# Patient Record
Sex: MALE | Race: WHITE | NOT HISPANIC OR LATINO | Employment: OTHER | URBAN - METROPOLITAN AREA
[De-identification: names, ages, dates, MRNs, and addresses within clinical notes are randomized per-mention and may not be internally consistent; named-entity substitution may affect disease eponyms.]

---

## 2018-08-30 ENCOUNTER — HOSPITAL ENCOUNTER (OUTPATIENT)
Dept: RADIOLOGY | Facility: HOSPITAL | Age: 78
Discharge: HOME | End: 2018-08-30
Attending: ORTHOPAEDIC SURGERY
Payer: MEDICARE

## 2018-08-30 DIAGNOSIS — M25.511 PAIN IN RIGHT SHOULDER: ICD-10-CM

## 2018-08-30 PROCEDURE — 73030 X-RAY EXAM OF SHOULDER: CPT | Mod: TC,RT

## 2020-08-17 ENCOUNTER — OFFICE VISIT (OUTPATIENT)
Dept: CARDIOLOGY | Facility: CLINIC | Age: 80
End: 2020-08-17
Payer: MEDICARE

## 2020-08-17 VITALS
SYSTOLIC BLOOD PRESSURE: 153 MMHG | HEIGHT: 68 IN | HEART RATE: 66 BPM | DIASTOLIC BLOOD PRESSURE: 82 MMHG | BODY MASS INDEX: 29.27 KG/M2 | WEIGHT: 193.13 LBS | OXYGEN SATURATION: 95 %

## 2020-08-17 DIAGNOSIS — E78.5 HYPERLIPIDEMIA, UNSPECIFIED HYPERLIPIDEMIA TYPE: ICD-10-CM

## 2020-08-17 DIAGNOSIS — I25.119 CORONARY ARTERY DISEASE INVOLVING NATIVE HEART WITH ANGINA PECTORIS, UNSPECIFIED VESSEL OR LESION TYPE: ICD-10-CM

## 2020-08-17 DIAGNOSIS — Z95.1 S/P CABG (CORONARY ARTERY BYPASS GRAFT): ICD-10-CM

## 2020-08-17 DIAGNOSIS — M48.061 SPINAL STENOSIS OF LUMBAR REGION, UNSPECIFIED WHETHER NEUROGENIC CLAUDICATION PRESENT: ICD-10-CM

## 2020-08-17 DIAGNOSIS — F41.9 ANXIETY: ICD-10-CM

## 2020-08-17 PROBLEM — I25.10 CORONARY ARTERY DISEASE INVOLVING NATIVE HEART: Status: ACTIVE | Noted: 2020-08-17

## 2020-08-17 PROCEDURE — 99214 OFFICE O/P EST MOD 30 MIN: CPT | Mod: PBBFAC | Performed by: INTERNAL MEDICINE

## 2020-08-17 PROCEDURE — 99999 PR PBB SHADOW E&M-EST. PATIENT-LVL IV: ICD-10-PCS | Mod: PBBFAC,,, | Performed by: INTERNAL MEDICINE

## 2020-08-17 PROCEDURE — 99204 PR OFFICE/OUTPT VISIT, NEW, LEVL IV, 45-59 MIN: ICD-10-PCS | Mod: S$PBB,,, | Performed by: INTERNAL MEDICINE

## 2020-08-17 PROCEDURE — 99204 OFFICE O/P NEW MOD 45 MIN: CPT | Mod: S$PBB,,, | Performed by: INTERNAL MEDICINE

## 2020-08-17 PROCEDURE — 99999 PR PBB SHADOW E&M-EST. PATIENT-LVL IV: CPT | Mod: PBBFAC,,, | Performed by: INTERNAL MEDICINE

## 2020-08-17 RX ORDER — GLUCOSAMINE/CHONDRO SU A 500-400 MG
1 TABLET ORAL DAILY
COMMUNITY

## 2020-08-17 RX ORDER — ASPIRIN 81 MG/1
81 TABLET ORAL DAILY
COMMUNITY

## 2020-08-17 RX ORDER — ASCORBIC ACID 500 MG
1000 TABLET ORAL DAILY
COMMUNITY

## 2020-08-17 RX ORDER — ACETAMINOPHEN 500 MG
5000 TABLET ORAL DAILY
COMMUNITY

## 2020-08-17 RX ORDER — AMOXICILLIN 500 MG
1 CAPSULE ORAL DAILY
COMMUNITY

## 2020-08-17 RX ORDER — METOPROLOL SUCCINATE 25 MG/1
25 TABLET, EXTENDED RELEASE ORAL 2 TIMES DAILY
Qty: 30 TABLET | Refills: 11 | Status: SHIPPED | OUTPATIENT
Start: 2020-08-17

## 2020-08-17 RX ORDER — NITROGLYCERIN 0.4 MG/1
0.4 TABLET SUBLINGUAL EVERY 5 MIN PRN
Qty: 25 TABLET | Refills: 0 | Status: SHIPPED | OUTPATIENT
Start: 2020-08-17 | End: 2021-08-17

## 2020-08-17 RX ORDER — ISOSORBIDE MONONITRATE 30 MG/1
30 TABLET, EXTENDED RELEASE ORAL 2 TIMES DAILY
Qty: 30 TABLET | Refills: 11 | Status: SHIPPED | OUTPATIENT
Start: 2020-08-17

## 2020-08-17 NOTE — ASSESSMENT & PLAN NOTE
[facility] from 7/23/20 with Dr Baron reviewed which showed diffuse RCA disease with occluded SVG-Diag and SVG-RCA. LIMA graft is no longer filling the LAD. He is on low dose Imdur and Toprol XL which we will increase today.

## 2020-08-17 NOTE — LETTER
August 17, 2020      Richy Geiger MD  [address]  [address]  [facility] [address]           [facility] 3rd Fl  [address]  [facility] [address]  Phone: [phone]          Patient: Giacomo Scales   MR Number: [id number]   YOB: 1940   Date of Visit: 8/17/2020       Dear Dr. Richy Geiger:    Thank you for referring Giacomo Scales to me for evaluation. Attached you will find relevant portions of my assessment and plan of care.    If you have questions, please do not hesitate to call me. I look forward to following Giacomo Scales along with you.    Sincerely,    Lei Astorga MD    Enclosure  CC:  No Recipients    If you would like to receive this communication electronically, please contact [email or web address] or [phone] to request more information on [employer] Link access.    For providers and/or their staff who would like to refer a patient to [facility], please contact us through our one-stop-shop provider referral line, [facility] [place], at [phone].    If you feel you have received this communication in error or would no longer like to receive these types of communications, please e-mail [email or web address]

## 2020-08-17 NOTE — PROGRESS NOTES
Subjective:    Patient ID:  Giacomo Scales is a 80 y.o. male who presents for evaluation of Coronary artery disease    Referring Physician: Dr. Geiger    HPI  Mr. Scales is a 80 year old male with a past medical history of CAD (s/p CABG LIMA-LAD, SVG-Diag, SVG-RCA), spinal stenosis with neuropathy, HLD, tricuspid regurgitation, and anxiety who presents for evaluation of recurrent CP following angiogram. Patient underwent coronary angiogram on 723/20 at [facility] with Dr. Baron that showed diffuse RCA disease with occluded SVG-Diag and possible occluded LIMA-LAD. Today he reports dyspnea on exertion after about 50 feet along with chest pressure that has been present since after his CABG. He underwent CABG on 02/27/2020 after an angiogram showed severe multivessel disease. He reported SOB as his symptom prior to CABG.  He went to cardiac rehab for about 2 months after surgery without symptoms. He smoked when he was a teenager but has not smoked in about 40 years. He denies LE swelling, orthopnea, PND, palpitations, and weight gain.     NYHA: II CCS: 0    Review of Systems   Constitution: Positive for malaise/fatigue. Negative for chills and fever.   HENT: Negative for sore throat.    Eyes: Negative for blurred vision.   Cardiovascular: Positive for dyspnea on exertion. Negative for chest pain, claudication, cyanosis, irregular heartbeat, leg swelling, near-syncope, orthopnea, palpitations, paroxysmal nocturnal dyspnea and syncope.   Respiratory: Negative for cough and sputum production.    Hematologic/Lymphatic: Does not bruise/bleed easily.   Skin: Negative for itching, rash and suspicious lesions.   Musculoskeletal: Negative for falls.   Gastrointestinal: Negative for abdominal pain and change in bowel habit.   Genitourinary: Negative for dysuria.   Neurological: Negative for disturbances in coordination, dizziness and loss of balance.   Psychiatric/Behavioral: Negative for altered mental status.       "    Past Medical History:   Diagnosis Date    Coronary artery disease      Current Outpatient Medications on File Prior to Visit   Medication Sig Dispense Refill    ascorbic acid, vitamin C, (VITAMIN C) 500 MG tablet Take 1,000 mg by mouth once daily.      aspirin (ECOTRIN) 81 MG EC tablet Take 81 mg by mouth once daily.      cholecalciferol, vitamin D3, (VITAMIN D3) 125 mcg (5,000 unit) Tab Take 5,000 Units by mouth once daily.      folic acid/multivit-min/lutein (CENTRUM SILVER ORAL) Take 1 tablet by mouth once daily.      glucosamine-chondroitin 500-400 mg tablet Take 1 tablet by mouth once daily.      omega-3 fatty acids/fish oil (FISH OIL-OMEGA-3 FATTY ACIDS) 300-1,000 mg capsule Take 1 capsule by mouth once daily.       No current facility-administered medications on file prior to visit.        Vitals:    08/17/20 0911 08/17/20 0913   BP: (!) 148/79 (!) 153/82   BP Location: Right arm Left arm   Patient Position: Sitting Sitting   BP Method: Large (Automatic) Large (Automatic)   Pulse: 66 66   SpO2: 95%    Weight: 87.6 kg (193 lb 2 oz)    Height: 5' 8" (1.727 m)      Body mass index is 29.36 kg/m².  Objective:    Physical Exam   Constitutional: He is oriented to person, place, and time. He appears well-developed and well-nourished. No distress.   HENT:   Head: Normocephalic and atraumatic.   Eyes: EOM are normal.   Neck: Normal range of motion. No JVD present.   Cardiovascular: Normal rate, regular rhythm and intact distal pulses.   No murmur heard.  Pulmonary/Chest: Effort normal and breath sounds normal. No respiratory distress.   Abdominal: Soft. He exhibits no distension.   Musculoskeletal:         General: No edema.   Neurological: He is alert and oriented to person, place, and time.   Skin: Skin is warm and dry. He is not diaphoretic.   Vitals reviewed.        Test(s) Reviewed  I have reviewed the following in detail:  [] Stress test   [x] Angiography   [] Echocardiogram   [] Labs:   [] Other: "     Assessment:   Coronary artery disease involving native heart  [facility] from 7/23/20 with Dr Baron reviewed which showed diffuse RCA disease with occluded SVG-Diag and SVG-RCA. LIMA graft is no longer filling the LAD. He is on low dose Imdur and Toprol XL which we will increase today.     S/P CABG (coronary artery bypass graft)  S/p emergent CABG LIMA-LAD, SVG-Diag, SVG-RCA with Dr. Escobedo on 2/27/20 after a failing angioplasty on 2/26/20.     Anxiety  Controlled on Wellbutrin.     Lumbar stenosis  Chronic lower back pain limiting activity.     HLD (hyperlipidemia)  On Crestor 40 mg.     Plan:     1. Patient was offered angiogram +/- intervention and he will consider it. He will call back if he decided to proceed. Consents signed in clinic today.    2. Imdur 30 mg and Toprol XL 25 mg increased to BID.   3. SL NTG PRN ordered.     - The patient understands the risks and benefits and wishes to go ahead with the procedure.  - All patient's questions were answered.  -The risks, benefits & alternatives of the procedure were explained to the patient  -Discussed in detailed the risk of bleeding, infection, heart rhythm abnormalities, allergic reactions, kidney injury, stroke and death.    -The risks of moderate sedation include hypotension, respiratory depression, arrhythmias, bronchospasm, & death.    -Informed consent was obtained & the patient is agreeable to proceed with the procedure.  -This patient was discussed with the attending cardiologist who agrees with the above assessment & plan.       No orders of the defined types were placed in this encounter.    Medications Ordered This Encounter   Medications    isosorbide mononitrate (IMDUR) 30 MG 24 hr tablet    metoprolol succinate (TOPROL-XL) 25 MG 24 hr tablet    nitroGLYCERIN (NITROSTAT) 0.4 MG SL tablet       Kinza Cowan PA-C  Interventional Cardiology  [facility]    Staff:  I have personally taken the history and examined this patient  and agree with the fellow's note as stated above and amended it accordingly :-)  This unfortunate man had failed LAD PCI in Feb with emergent CABG with LIMA to LAD and SVG's x 3.  His symptoms didn't improve so he underwent angiography by Dr. Geiger in July 2020 which showed LIMA to LAD never fills the LAD distally.  Not sure if totalled or subtotalled.  SVG to RCA occluded and RCA diffusely diseased.  SVG to OMB occluded with diffuse LCX disease.  SVG to Diagonal subtotalled, small, and too small for intervention.      IMP:      CAD:  Failed or failing bypass grafts as above.      REC:     Restudy LIMA to LAD with possible PCI if subtotally occluded.  Access RCFA above [place].  Informed consent obtained.  Check PRU.

## 2022-05-25 ENCOUNTER — HOSPITAL ENCOUNTER (EMERGENCY)
Facility: HOSPITAL | Age: 82
Discharge: HOME | End: 2022-05-25
Attending: EMERGENCY MEDICINE
Payer: MEDICARE

## 2022-05-25 ENCOUNTER — HOSPITAL ENCOUNTER (OUTPATIENT)
Facility: HOSPITAL | Age: 82
Discharge: HOME | End: 2022-05-27
Attending: EMERGENCY MEDICINE | Admitting: INTERNAL MEDICINE
Payer: MEDICARE

## 2022-05-25 VITALS
DIASTOLIC BLOOD PRESSURE: 79 MMHG | TEMPERATURE: 98 F | SYSTOLIC BLOOD PRESSURE: 161 MMHG | HEART RATE: 56 BPM | RESPIRATION RATE: 18 BRPM | OXYGEN SATURATION: 95 %

## 2022-05-25 DIAGNOSIS — R04.0 EPISTAXIS: Primary | ICD-10-CM

## 2022-05-25 DIAGNOSIS — R07.9 CHEST PAIN: ICD-10-CM
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
## 2022-05-25 DIAGNOSIS — S09.90XA CLOSED HEAD INJURY, INITIAL ENCOUNTER: ICD-10-CM

## 2022-05-25 DIAGNOSIS — S01.81XA LACERATION OF FOREHEAD, INITIAL ENCOUNTER: ICD-10-CM

## 2022-05-25 LAB
ALBUMIN SERPL BCP-MCNC: 3.5 G/DL (ref 3.5–5.2)
ALP SERPL-CCNC: 45 U/L (ref 55–135)
ALT SERPL W/O P-5'-P-CCNC: 11 U/L (ref 10–44)
ANION GAP SERPL CALC-SCNC: 10 MMOL/L (ref 8–16)
AST SERPL-CCNC: 15 U/L (ref 10–40)
BASOPHILS # BLD AUTO: 0.01 K/UL (ref 0–0.2)
BASOPHILS NFR BLD: 0.1 % (ref 0–1.9)
BILIRUB SERPL-MCNC: 0.9 MG/DL (ref 0.1–1)
BUN SERPL-MCNC: 32 MG/DL (ref 6–30)
BUN SERPL-MCNC: 39 MG/DL (ref 8–23)
CALCIUM SERPL-MCNC: 9.5 MG/DL (ref 8.7–10.5)
CHLORIDE SERPL-SCNC: 105 MMOL/L (ref 95–110)
CHLORIDE SERPL-SCNC: 107 MMOL/L (ref 95–110)
CO2 SERPL-SCNC: 24 MMOL/L (ref 23–29)
CREAT SERPL-MCNC: 1.5 MG/DL (ref 0.5–1.4)
CREAT SERPL-MCNC: 1.6 MG/DL (ref 0.5–1.4)
DIFFERENTIAL METHOD: ABNORMAL
EOSINOPHIL # BLD AUTO: 0.1 K/UL (ref 0–0.5)
EOSINOPHIL NFR BLD: 0.7 % (ref 0–8)
ERYTHROCYTE [DISTWIDTH] IN BLOOD BY AUTOMATED COUNT: 12.8 % (ref 11.5–14.5)
EST. GFR  (AFRICAN AMERICAN): 46 ML/MIN/1.73 M^2
EST. GFR  (NON AFRICAN AMERICAN): 39.8 ML/MIN/1.73 M^2
GLUCOSE SERPL-MCNC: 122 MG/DL (ref 70–110)
GLUCOSE SERPL-MCNC: 133 MG/DL (ref 70–110)
HCT VFR BLD AUTO: 35.8 % (ref 40–54)
HCT VFR BLD CALC: 34 %PCV (ref 36–54)
HGB BLD-MCNC: 12.3 G/DL (ref 14–18)
IMM GRANULOCYTES # BLD AUTO: 0.02 K/UL (ref 0–0.04)
IMM GRANULOCYTES NFR BLD AUTO: 0.3 % (ref 0–0.5)
LYMPHOCYTES # BLD AUTO: 1.2 K/UL (ref 1–4.8)
LYMPHOCYTES NFR BLD: 16.9 % (ref 18–48)
MCH RBC QN AUTO: 35.3 PG (ref 27–31)
MCHC RBC AUTO-ENTMCNC: 34.4 G/DL (ref 32–36)
MCV RBC AUTO: 103 FL (ref 82–98)
MONOCYTES # BLD AUTO: 0.7 K/UL (ref 0.3–1)
MONOCYTES NFR BLD: 9.9 % (ref 4–15)
NEUTROPHILS # BLD AUTO: 5.1 K/UL (ref 1.8–7.7)
NEUTROPHILS NFR BLD: 72.1 % (ref 38–73)
NRBC BLD-RTO: 0 /100 WBC
PLATELET # BLD AUTO: 121 K/UL (ref 150–450)
PMV BLD AUTO: 9.2 FL (ref 9.2–12.9)
POC IONIZED CALCIUM: 1.28 MMOL/L (ref 1.06–1.42)
POC TCO2 (MEASURED): 26 MMOL/L (ref 23–29)
POTASSIUM BLD-SCNC: 4 MMOL/L (ref 3.5–5.1)
POTASSIUM SERPL-SCNC: 4.2 MMOL/L (ref 3.5–5.1)
PROT SERPL-MCNC: 6.5 G/DL (ref 6–8.4)
RBC # BLD AUTO: 3.48 M/UL (ref 4.6–6.2)
SAMPLE: ABNORMAL
SODIUM BLD-SCNC: 142 MMOL/L (ref 136–145)
SODIUM SERPL-SCNC: 141 MMOL/L (ref 136–145)
WBC # BLD AUTO: 7 K/UL (ref 3.9–12.7)

## 2022-05-25 PROCEDURE — 80053 COMPREHEN METABOLIC PANEL: CPT | Performed by: PHYSICIAN ASSISTANT

## 2022-05-25 PROCEDURE — 12001 RPR S/N/AX/GEN/TRNK 2.5CM/<: CPT

## 2022-05-25 PROCEDURE — 80047 BASIC METABLC PNL IONIZED CA: CPT

## 2022-05-25 PROCEDURE — 25000003 PHARM REV CODE 250: Performed by: EMERGENCY MEDICINE

## 2022-05-25 PROCEDURE — 85025 COMPLETE CBC W/AUTO DIFF WBC: CPT | Performed by: PHYSICIAN ASSISTANT

## 2022-05-25 PROCEDURE — 99220 PR INITIAL OBSERVATION CARE,LEVL III: ICD-10-PCS | Mod: ,,, | Performed by: PHYSICIAN ASSISTANT

## 2022-05-25 PROCEDURE — 99285 PR EMERGENCY DEPT VISIT,LEVEL V: ICD-10-PCS | Mod: 25,,, | Performed by: EMERGENCY MEDICINE

## 2022-05-25 PROCEDURE — 99285 EMERGENCY DEPT VISIT HI MDM: CPT | Mod: 25,,, | Performed by: EMERGENCY MEDICINE

## 2022-05-25 PROCEDURE — 25000003 PHARM REV CODE 250: Performed by: PHYSICIAN ASSISTANT

## 2022-05-25 PROCEDURE — 12001 RPR S/N/AX/GEN/TRNK 2.5CM/<: CPT | Mod: 51,,, | Performed by: EMERGENCY MEDICINE

## 2022-05-25 PROCEDURE — G0378 HOSPITAL OBSERVATION PER HR: HCPCS

## 2022-05-25 PROCEDURE — 30903 CONTROL OF NOSEBLEED: CPT | Mod: LT

## 2022-05-25 PROCEDURE — 99499 UNLISTED E&M SERVICE: CPT | Mod: ,,, | Performed by: PHYSICIAN ASSISTANT

## 2022-05-25 PROCEDURE — 82330 ASSAY OF CALCIUM: CPT

## 2022-05-25 PROCEDURE — 99220 PR INITIAL OBSERVATION CARE,LEVL III: CPT | Mod: ,,, | Performed by: PHYSICIAN ASSISTANT

## 2022-05-25 PROCEDURE — 99285 EMERGENCY DEPT VISIT HI MDM: CPT | Mod: 25

## 2022-05-25 PROCEDURE — 12001 PR RESUPERF WND BODY <2.5CM: ICD-10-PCS | Mod: 51,,, | Performed by: EMERGENCY MEDICINE

## 2022-05-25 PROCEDURE — 99284 EMERGENCY DEPT VISIT MOD MDM: CPT | Mod: 25,27

## 2022-05-25 PROCEDURE — 99499 NO LOS: ICD-10-PCS | Mod: ,,, | Performed by: PHYSICIAN ASSISTANT

## 2022-05-25 PROCEDURE — 30903 PR CTRL NOSEBLEED,ANTER,COMPLEX: ICD-10-PCS | Mod: 59,,, | Performed by: EMERGENCY MEDICINE

## 2022-05-25 PROCEDURE — 30903 CONTROL OF NOSEBLEED: CPT | Mod: 59,,, | Performed by: EMERGENCY MEDICINE

## 2022-05-25 RX ORDER — PROMETHAZINE HYDROCHLORIDE 25 MG/1
25 TABLET ORAL EVERY 6 HOURS PRN
Status: DISCONTINUED | OUTPATIENT
Start: 2022-05-26 | End: 2022-05-27 | Stop reason: HOSPADM

## 2022-05-25 RX ORDER — GLUCAGON 1 MG
1 KIT INJECTION
Status: DISCONTINUED | OUTPATIENT
Start: 2022-05-26 | End: 2022-05-27 | Stop reason: HOSPADM

## 2022-05-25 RX ORDER — IBUPROFEN 200 MG
16 TABLET ORAL
Status: DISCONTINUED | OUTPATIENT
Start: 2022-05-26 | End: 2022-05-27 | Stop reason: HOSPADM

## 2022-05-25 RX ORDER — TALC
6 POWDER (GRAM) TOPICAL NIGHTLY PRN
Status: DISCONTINUED | OUTPATIENT
Start: 2022-05-26 | End: 2022-05-27 | Stop reason: HOSPADM

## 2022-05-25 RX ORDER — POLYETHYLENE GLYCOL 3350 17 G/17G
17 POWDER, FOR SOLUTION ORAL DAILY PRN
Status: DISCONTINUED | OUTPATIENT
Start: 2022-05-26 | End: 2022-05-27 | Stop reason: HOSPADM

## 2022-05-25 RX ORDER — BISACODYL 10 MG
10 SUPPOSITORY, RECTAL RECTAL DAILY PRN
Status: DISCONTINUED | OUTPATIENT
Start: 2022-05-26 | End: 2022-05-27 | Stop reason: HOSPADM

## 2022-05-25 RX ORDER — ACETAMINOPHEN 325 MG/1
650 TABLET ORAL EVERY 4 HOURS PRN
Status: DISCONTINUED | OUTPATIENT
Start: 2022-05-26 | End: 2022-05-27 | Stop reason: HOSPADM

## 2022-05-25 RX ORDER — TICAGRELOR 90 MG/1
90 TABLET ORAL 2 TIMES DAILY
COMMUNITY
Start: 2022-02-14

## 2022-05-25 RX ORDER — IPRATROPIUM BROMIDE AND ALBUTEROL SULFATE 2.5; .5 MG/3ML; MG/3ML
3 SOLUTION RESPIRATORY (INHALATION) EVERY 4 HOURS PRN
Status: DISCONTINUED | OUTPATIENT
Start: 2022-05-26 | End: 2022-05-27 | Stop reason: HOSPADM

## 2022-05-25 RX ORDER — AMOXICILLIN AND CLAVULANATE POTASSIUM 875; 125 MG/1; MG/1
1 TABLET, FILM COATED ORAL 2 TIMES DAILY
Qty: 14 TABLET | Refills: 0 | Status: SHIPPED | OUTPATIENT
Start: 2022-05-25 | End: 2022-05-27 | Stop reason: SDUPTHER

## 2022-05-25 RX ORDER — ONDANSETRON 8 MG/1
8 TABLET, ORALLY DISINTEGRATING ORAL EVERY 8 HOURS PRN
Status: DISCONTINUED | OUTPATIENT
Start: 2022-05-26 | End: 2022-05-27 | Stop reason: HOSPADM

## 2022-05-25 RX ORDER — OXYMETAZOLINE HCL 0.05 %
1 SPRAY, NON-AEROSOL (ML) NASAL
Status: COMPLETED | OUTPATIENT
Start: 2022-05-25 | End: 2022-05-25

## 2022-05-25 RX ORDER — IBUPROFEN 200 MG
24 TABLET ORAL
Status: DISCONTINUED | OUTPATIENT
Start: 2022-05-26 | End: 2022-05-27 | Stop reason: HOSPADM

## 2022-05-25 RX ADMIN — LIDOCAINE HYDROCHLORIDE 1 ML: 10; .005 INJECTION, SOLUTION EPIDURAL; INFILTRATION; INTRACAUDAL; PERINEURAL at 10:05

## 2022-05-25 RX ADMIN — Medication: at 08:05

## 2022-05-25 RX ADMIN — Medication 1 SPRAY: at 10:05

## 2022-05-25 RX ADMIN — Medication: at 10:05

## 2022-05-25 RX ADMIN — Medication 1 SPRAY: at 04:05

## 2022-05-25 NOTE — ED NOTES
Forehead laceration bleeding controlled with gauze. Waiting on provider. PT in no acute distress.

## 2022-05-25 NOTE — ED NOTES
Giacomo Scales, a 82 y.o. male presents to the ED w/ complaint of a mechanical fall, pt states he was trying to sit down in a chair and tripped over his feet. Reports most recent fall about a month ago. + blood thinners. Forehead and nose laceration noted.     Triage note:  Chief Complaint   Patient presents with    Fall     Arrives with house sup from [facility] in hospital was here visiting his wife in surgery and had a mechanical fall, pt is on blood thinners, hit his head, laceration noted to nose and forehead     Review of patient's allergies indicates:   Allergen Reactions    Ibuprofen      Causes nose bleed    Tylenol-codeine #3 [acetaminophen-codeine]      Upset stomach and dizzness     Past Medical History:   Diagnosis Date    Coronary artery disease      Two patient identifiers have been checked and are correct.      Appearance: Pt awake, alert & oriented to person, place & time. Pt in no acute distress at present time. Pt is clean and well groomed with clothes appropriately fastened.   Skin: Skin warm, dry & intact. Color consistent with ethnicity. Mucous membranes moist. No breakdown or brusing noted.  Forehead laceration from mechanical fall  Musculoskeletal: Patient moving all extremities well, no obvious swelling or deformities noted.   Respiratory: Respirations spontaneous, even, and non-labored. Visible chest rise noted. Airway is open and patent. No accessory muscle use noted.   Neurologic: Sensation is intact. Speech is clear and appropriate. Eyes open spontaneously, behavior appropriate to situation, follows commands, facial expression symmetrical, bilateral hand grasp equal and even, purposeful motor response noted.  Cardiac: All peripheral pulses present. No Bilateral lower extremity edema. Cap refill is <3 seconds.  Abdomen: Abdomen soft, non-tender to palpation.   : Pt reports no dysuria or hematuria.

## 2022-05-25 NOTE — ED NOTES
Pt AAOx4. RR is even, unlabored, and spontaneous. Pt's wife is having surgery at [facility] -- pt fell in [facility]. Pt hit head; denies LOC. Edema and hematoma present on forehead. Epistaxis present at time of injury. Pt d/c'd earlier, however, pt returned to ED d/t bleeding re-starting from nose. Pt on blood thinners. Denies pain. Family at bedside. Bed low and locked; side rails up x2; call light within reach. Will continue to monitor.    Patient identifiers for Giacomo Scales 82 y.o. male checked and correct.  Chief Complaint   Patient presents with    Fall     TRIPPED AND FELL IN [facility],seen in er earlier,   had ct scan, hematoma to forehead, nose bleeding more     Past Medical History:   Diagnosis Date    Coronary artery disease      Allergies reported:   Review of patient's allergies indicates:   Allergen Reactions    Ibuprofen      Causes nose bleed    Tylenol-codeine #3 [acetaminophen-codeine]      Upset stomach and dizzness

## 2022-05-25 NOTE — ED PROVIDER NOTES
Encounter Date: 5/25/2022       History     Chief Complaint   Patient presents with    Fall     Ar[facility] with [facility] sup [facility] in hospital was here visiting his wife in surgery and had a mechanical fall, pt is on blood thinners, hit his head, laceration noted to nose and forehead     HPI     This is an 82-year-old man who presents status post mechanical fall.  His wife had surgery here today and he was visiting her.  He reports that he tripped over his feet.  He did hit his head, but did not pass out.  He takes Brilinta for CAD.  He otherwise denies health conditions.  He reports that his tetanus has been updated in the past 5 years.  He denies any other extremity pain or symptoms.  Review of patient's allergies indicates:   Allergen Reactions    Ibuprofen      Causes nose bleed    Tylenol-codeine #3 [acetaminophen-codeine]      Upset stomach and dizzness     Past Medical History:   Diagnosis Date    Coronary artery disease      Past Surgical History:   Procedure Laterality Date    CORONARY ANGIOPLASTY      CORONARY ARTERY BYPASS GRAFT       Family History   Problem Relation Age of Onset    Heart disease Mother     Hypertension Mother     Heart disease Father     Hypertension Father     No Known Problems Sister     No Known Problems Brother     No Known Problems Maternal Aunt     No Known Problems Maternal Uncle     No Known Problems Paternal Aunt     No Known Problems Paternal Uncle     No Known Problems Maternal Grandmother     No Known Problems Maternal Grandfather     No Known Problems Paternal Grandmother     No Known Problems Paternal Grandfather     Anemia Neg Hx     Arrhythmia Neg Hx     Asthma Neg Hx     Clotting disorder Neg Hx     Fainting Neg Hx     Heart attack Neg Hx     Heart failure Neg Hx     Hyperlipidemia Neg Hx     Stroke Neg Hx     Atrial Septal Defect Neg Hx      Social History     Tobacco Use    Smoking status: Former Smoker    Smokeless tobacco: Never Used    Substance Use Topics    Alcohol use: Never    Drug use: Never     Review of Systems   Constitutional: Negative for chills, diaphoresis, fatigue and fever.   HENT: Negative for congestion, rhinorrhea and sore throat.    Eyes: Negative for visual disturbance.   Respiratory: Negative for cough, chest tightness and shortness of breath.    Cardiovascular: Negative for chest pain.   Gastrointestinal: Negative for abdominal pain, blood in stool, constipation, diarrhea and vomiting.   Genitourinary: Negative for dysuria, hematuria and urgency.   Musculoskeletal: Negative for back pain.   Skin: Positive for wound. Negative for rash.   Neurological: Negative for seizures and syncope.   Hematological: Does not bruise/bleed easily.   Psychiatric/Behavioral: Negative for agitation and hallucinations.       Physical Exam     Initial Vitals [05/25/22 1445]   BP Pulse Resp Temp SpO2   (!) 156/78 66 18 97.5 °F (36.4 °C) 100 %      MAP       --         Physical Exam  Gen: AxOx4, NAD, appears stated age, well appearing  Eye: EOMI, pupils are equal and reactive to light, no scleral icterus, no periorbital edema or ecchymosis  Head:  There is an abrasion and hematoma noted to the right central forehead, that is oozing a small amount of blood, there is ecchymosis and swelling to the nasal bridge, no laceration.  Scalp otherwise grossly normal.  ENT: neck supple, no stridor, no masses, no abnormal phonation or drooling, cervical spine is nontender in the midline, no step-offs or deformities.  CVS: warm and well perfused, cap refill <2 seconds, no extremity pallor  PULM: normal work of breathing, no stridor, equal chest rise, no peripheral cyanosis  ABD: scaphoid, nondistended  Ext: no rash, no deformities, moving all joints with normal ROM  Neuro: MCNULTY, 5/5 strength in bilateral upper and lower extremities, gait intact, face grossly symmetric  Psych: well groomed, mood and affect congruent, makes good eye contact, cooperative    ED  Course   Lac Repair    Date/Time: 5/25/2022 10:16 PM  Performed by: Fatoumata Maldonado MD  Authorized by: Fatoumata Maldonado MD     Consent:     Consent obtained:  Verbal    Consent given by:  Patient    Risks, benefits, and alternatives were discussed: yes    Universal protocol:     Patient identity confirmed:  Verbally with patient  Anesthesia:     Anesthesia method:  None  Laceration details:     Location:  Scalp    Scalp location:  Frontal    Length (cm):  0.5    Depth (mm):  5  Pre-procedure details:     Preparation:  Imaging obtained to evaluate for foreign bodies  Exploration:     Wound exploration: wound explored through full range of motion    Treatment:     Area cleansed with:  Saline    Amount of cleaning:  Standard    Debridement:  None  Skin repair:     Repair method:  Tissue adhesive  Approximation:     Approximation:  Close  Repair type:     Repair type:  Simple  Post-procedure details:     Dressing:  Open (no dressing)      Labs Reviewed - No data to display       Imaging Results          CT Head Without Contrast (Final result)  Result time 05/25/22 15:29:33    Final result by Loyd Rasheed MD (05/25/22 15:29:33)                 Impression:      1. No acute intracranial abnormalities noting sequela of chronic microvascular ischemic change, senescent change, and suspected remote lacunar infarcts involving the bilateral basal ganglia.  2. Subcutaneous hematoma overlies the midline to right frontal calvarium.  There is likely superimposed laceration.      Electronically signed by: Loyd Rasheed MD  Date:    05/25/2022  Time:    15:29             Narrative:    EXAMINATION:  CT HEAD WITHOUT CONTRAST    CLINICAL HISTORY:  Head trauma, minor (Age >= 65y);    TECHNIQUE:  Low dose axial images were obtained through the head.  Coronal and sagittal reformations were also performed. Contrast was not administered.    COMPARISON:  None.    FINDINGS:  There is generalized cerebral volume loss.  There is  hypoattenuation in a periventricular fashion, likely sequela of chronic microvascular ischemic change.There are a few scattered punctate foci of low attenuation within the basal ganglia bilaterally suggesting sequela of remote infarct.  There is no evidence of acute major vascular territory infarct, hemorrhage, or mass.  There is no hydrocephalus.  There are no abnormal extra-axial fluid collections.  There is fluid layering within the left sphenoid sinus, otherwise the visualized paranasal sinuses and mastoid air cells are clear, and there is no evidence of calvarial fracture.  The visualized soft tissues are remarkable for soft tissue hematoma overlying the midline frontal calvarium with superimposed laceration..                                 Medications   oxymetazoline 0.05 % nasal spray 1 spray (1 spray Each Nostril Given 5/25/22 1615)     Medical Decision Making:   Initial Assessment:   This is a 82-year-old gentleman who presents status post mechanical fall in the [facility].  He did not lose consciousness, but given he is anticoagulated, I cannot clear him by Vienna criteria, so I am concerned about possible intracranial hemorrhage and will obtain a CT scan of the head.  He does not have any cervical spine tenderness stick palpation in the midline, do not think a CT scan of the cervical spine is indicated.  Secondary survey is otherwise intact, do not think there are any other bony injuries.  He does have some slight epistaxis, oozing of blood from his right naris, so I have given him Afrin.  His tetanus is up-to-date.  Clinical Tests:   Radiological Study: Ordered and Reviewed  ED Management:  CT scan by my independent interpretation does not show any acute traumatic injury.  I repaired his laceration with glue.  He is discharged in stable condition.                      Clinical Impression:   Final diagnoses:  [W19.XXXA] Fall, initial encounter (Primary)  [S01.81XA] Laceration of forehead, initial  encounter  [S09.90XA] Closed head injury, initial encounter          ED Disposition Condition    Discharge Stable        ED Prescriptions     None        Follow-up Information     Follow up With Specialties Details Why Contact Info    Carlitos Eagle MD Family Medicine Schedule an appointment as soon as possible for a visit   [address] [address]  [phone]      [facility] - Emergency Dept Emergency Medicine  If symptoms worsen [address] [facility] [id number] [phone]           Fatoumata Maldonado MD  05/25/22 2441

## 2022-05-26 PROBLEM — I10 HYPERTENSION: Status: ACTIVE | Noted: 2022-05-26

## 2022-05-26 LAB
ABO + RH BLD: NORMAL
ANION GAP SERPL CALC-SCNC: 12 MMOL/L (ref 8–16)
BASOPHILS # BLD AUTO: 0.01 K/UL (ref 0–0.2)
BASOPHILS # BLD AUTO: 0.01 K/UL (ref 0–0.2)
BASOPHILS # BLD AUTO: 0.02 K/UL (ref 0–0.2)
BASOPHILS NFR BLD: 0.1 % (ref 0–1.9)
BASOPHILS NFR BLD: 0.1 % (ref 0–1.9)
BASOPHILS NFR BLD: 0.2 % (ref 0–1.9)
BLD GP AB SCN CELLS X3 SERPL QL: NORMAL
BUN SERPL-MCNC: 51 MG/DL (ref 8–23)
CALCIUM SERPL-MCNC: 9.4 MG/DL (ref 8.7–10.5)
CHLORIDE SERPL-SCNC: 106 MMOL/L (ref 95–110)
CO2 SERPL-SCNC: 24 MMOL/L (ref 23–29)
CREAT SERPL-MCNC: 1.3 MG/DL (ref 0.5–1.4)
DIFFERENTIAL METHOD: ABNORMAL
EOSINOPHIL # BLD AUTO: 0 K/UL (ref 0–0.5)
EOSINOPHIL NFR BLD: 0.1 % (ref 0–8)
ERYTHROCYTE [DISTWIDTH] IN BLOOD BY AUTOMATED COUNT: 12.8 % (ref 11.5–14.5)
ERYTHROCYTE [DISTWIDTH] IN BLOOD BY AUTOMATED COUNT: 12.9 % (ref 11.5–14.5)
ERYTHROCYTE [DISTWIDTH] IN BLOOD BY AUTOMATED COUNT: 13.3 % (ref 11.5–14.5)
EST. GFR  (AFRICAN AMERICAN): 59.2 ML/MIN/1.73 M^2
EST. GFR  (NON AFRICAN AMERICAN): 51.2 ML/MIN/1.73 M^2
ESTIMATED AVG GLUCOSE: 88 MG/DL (ref 68–131)
GLUCOSE SERPL-MCNC: 121 MG/DL (ref 70–110)
HBA1C MFR BLD: 4.7 % (ref 4–5.6)
HCT VFR BLD AUTO: 32.5 % (ref 40–54)
HCT VFR BLD AUTO: 32.9 % (ref 40–54)
HCT VFR BLD AUTO: 33.7 % (ref 40–54)
HGB BLD-MCNC: 10.9 G/DL (ref 14–18)
HGB BLD-MCNC: 11.1 G/DL (ref 14–18)
HGB BLD-MCNC: 11.1 G/DL (ref 14–18)
IMM GRANULOCYTES # BLD AUTO: 0.02 K/UL (ref 0–0.04)
IMM GRANULOCYTES # BLD AUTO: 0.02 K/UL (ref 0–0.04)
IMM GRANULOCYTES # BLD AUTO: 0.03 K/UL (ref 0–0.04)
IMM GRANULOCYTES NFR BLD AUTO: 0.2 % (ref 0–0.5)
IMM GRANULOCYTES NFR BLD AUTO: 0.3 % (ref 0–0.5)
IMM GRANULOCYTES NFR BLD AUTO: 0.3 % (ref 0–0.5)
LYMPHOCYTES # BLD AUTO: 1.3 K/UL (ref 1–4.8)
LYMPHOCYTES # BLD AUTO: 1.4 K/UL (ref 1–4.8)
LYMPHOCYTES # BLD AUTO: 1.7 K/UL (ref 1–4.8)
LYMPHOCYTES NFR BLD: 16.1 % (ref 18–48)
LYMPHOCYTES NFR BLD: 17 % (ref 18–48)
LYMPHOCYTES NFR BLD: 17.1 % (ref 18–48)
MAGNESIUM SERPL-MCNC: 1.7 MG/DL (ref 1.6–2.6)
MCH RBC QN AUTO: 34.1 PG (ref 27–31)
MCH RBC QN AUTO: 34.4 PG (ref 27–31)
MCH RBC QN AUTO: 35 PG (ref 27–31)
MCHC RBC AUTO-ENTMCNC: 32.9 G/DL (ref 32–36)
MCHC RBC AUTO-ENTMCNC: 33.1 G/DL (ref 32–36)
MCHC RBC AUTO-ENTMCNC: 34.2 G/DL (ref 32–36)
MCV RBC AUTO: 101 FL (ref 82–98)
MCV RBC AUTO: 103 FL (ref 82–98)
MCV RBC AUTO: 106 FL (ref 82–98)
MONOCYTES # BLD AUTO: 0.8 K/UL (ref 0.3–1)
MONOCYTES # BLD AUTO: 1 K/UL (ref 0.3–1)
MONOCYTES # BLD AUTO: 1.1 K/UL (ref 0.3–1)
MONOCYTES NFR BLD: 10 % (ref 4–15)
MONOCYTES NFR BLD: 10.8 % (ref 4–15)
MONOCYTES NFR BLD: 11.9 % (ref 4–15)
NEUTROPHILS # BLD AUTO: 5.7 K/UL (ref 1.8–7.7)
NEUTROPHILS # BLD AUTO: 5.8 K/UL (ref 1.8–7.7)
NEUTROPHILS # BLD AUTO: 7.3 K/UL (ref 1.8–7.7)
NEUTROPHILS NFR BLD: 70.7 % (ref 38–73)
NEUTROPHILS NFR BLD: 71.5 % (ref 38–73)
NEUTROPHILS NFR BLD: 73.4 % (ref 38–73)
NRBC BLD-RTO: 0 /100 WBC
PLATELET # BLD AUTO: 127 K/UL (ref 150–450)
PLATELET # BLD AUTO: 129 K/UL (ref 150–450)
PLATELET # BLD AUTO: 137 K/UL (ref 150–450)
PMV BLD AUTO: 9.1 FL (ref 9.2–12.9)
PMV BLD AUTO: 9.2 FL (ref 9.2–12.9)
PMV BLD AUTO: 9.2 FL (ref 9.2–12.9)
POCT GLUCOSE: 113 MG/DL (ref 70–110)
POCT GLUCOSE: 118 MG/DL (ref 70–110)
POCT GLUCOSE: 131 MG/DL (ref 70–110)
POTASSIUM SERPL-SCNC: 4.2 MMOL/L (ref 3.5–5.1)
RBC # BLD AUTO: 3.17 M/UL (ref 4.6–6.2)
RBC # BLD AUTO: 3.2 M/UL (ref 4.6–6.2)
RBC # BLD AUTO: 3.23 M/UL (ref 4.6–6.2)
SODIUM SERPL-SCNC: 142 MMOL/L (ref 136–145)
WBC # BLD AUTO: 10.14 K/UL (ref 3.9–12.7)
WBC # BLD AUTO: 7.78 K/UL (ref 3.9–12.7)
WBC # BLD AUTO: 8.27 K/UL (ref 3.9–12.7)

## 2022-05-26 PROCEDURE — 99226 PR SUBSEQUENT OBSERVATION CARE,LEVEL III: ICD-10-PCS | Mod: ,,,

## 2022-05-26 PROCEDURE — 25000003 PHARM REV CODE 250: Performed by: PHYSICIAN ASSISTANT

## 2022-05-26 PROCEDURE — 63600175 PHARM REV CODE 636 W HCPCS: Performed by: PHYSICIAN ASSISTANT

## 2022-05-26 PROCEDURE — 83036 HEMOGLOBIN GLYCOSYLATED A1C: CPT | Performed by: PHYSICIAN ASSISTANT

## 2022-05-26 PROCEDURE — G0378 HOSPITAL OBSERVATION PER HR: HCPCS

## 2022-05-26 PROCEDURE — 80048 BASIC METABOLIC PNL TOTAL CA: CPT | Performed by: PHYSICIAN ASSISTANT

## 2022-05-26 PROCEDURE — 36415 COLL VENOUS BLD VENIPUNCTURE: CPT | Performed by: PHYSICIAN ASSISTANT

## 2022-05-26 PROCEDURE — 83735 ASSAY OF MAGNESIUM: CPT | Performed by: PHYSICIAN ASSISTANT

## 2022-05-26 PROCEDURE — 85025 COMPLETE CBC W/AUTO DIFF WBC: CPT | Performed by: PHYSICIAN ASSISTANT

## 2022-05-26 PROCEDURE — 86850 RBC ANTIBODY SCREEN: CPT | Performed by: PHYSICIAN ASSISTANT

## 2022-05-26 PROCEDURE — 25000003 PHARM REV CODE 250

## 2022-05-26 PROCEDURE — 99226 PR SUBSEQUENT OBSERVATION CARE,LEVEL III: CPT | Mod: ,,,

## 2022-05-26 RX ORDER — TRAVOPROST OPHTHALMIC SOLUTION 0.04 MG/ML
1 SOLUTION OPHTHALMIC NIGHTLY
Status: DISCONTINUED | OUTPATIENT
Start: 2022-05-26 | End: 2022-05-27 | Stop reason: HOSPADM

## 2022-05-26 RX ORDER — RANOLAZINE 500 MG/1
500 TABLET, EXTENDED RELEASE ORAL 2 TIMES DAILY
Status: DISCONTINUED | OUTPATIENT
Start: 2022-05-26 | End: 2022-05-27 | Stop reason: HOSPADM

## 2022-05-26 RX ORDER — OXYMETAZOLINE HCL 0.05 %
2 SPRAY, NON-AEROSOL (ML) NASAL EVERY 4 HOURS
Status: DISCONTINUED | OUTPATIENT
Start: 2022-05-26 | End: 2022-05-27 | Stop reason: HOSPADM

## 2022-05-26 RX ORDER — ISOSORBIDE MONONITRATE 30 MG/1
30 TABLET, EXTENDED RELEASE ORAL 2 TIMES DAILY
Status: DISCONTINUED | OUTPATIENT
Start: 2022-05-26 | End: 2022-05-27 | Stop reason: HOSPADM

## 2022-05-26 RX ORDER — AMIODARONE HYDROCHLORIDE 200 MG/1
200 TABLET ORAL DAILY
Status: DISCONTINUED | OUTPATIENT
Start: 2022-05-26 | End: 2022-05-27 | Stop reason: HOSPADM

## 2022-05-26 RX ORDER — BUPROPION HYDROCHLORIDE 300 MG/1
300 TABLET ORAL DAILY
Status: DISCONTINUED | OUTPATIENT
Start: 2022-05-26 | End: 2022-05-27 | Stop reason: HOSPADM

## 2022-05-26 RX ORDER — TAMSULOSIN HYDROCHLORIDE 0.4 MG/1
0.4 CAPSULE ORAL DAILY
Status: DISCONTINUED | OUTPATIENT
Start: 2022-05-26 | End: 2022-05-27 | Stop reason: HOSPADM

## 2022-05-26 RX ORDER — METOPROLOL SUCCINATE 25 MG/1
25 TABLET, EXTENDED RELEASE ORAL 2 TIMES DAILY
Status: DISCONTINUED | OUTPATIENT
Start: 2022-05-26 | End: 2022-05-27 | Stop reason: HOSPADM

## 2022-05-26 RX ORDER — AMOXICILLIN AND CLAVULANATE POTASSIUM 500; 125 MG/1; MG/1
1 TABLET, FILM COATED ORAL 2 TIMES DAILY
Status: DISCONTINUED | OUTPATIENT
Start: 2022-05-26 | End: 2022-05-27 | Stop reason: HOSPADM

## 2022-05-26 RX ORDER — ATORVASTATIN CALCIUM 40 MG/1
40 TABLET, FILM COATED ORAL DAILY
Status: DISCONTINUED | OUTPATIENT
Start: 2022-05-26 | End: 2022-05-27 | Stop reason: HOSPADM

## 2022-05-26 RX ADMIN — METOPROLOL SUCCINATE 25 MG: 25 TABLET, EXTENDED RELEASE ORAL at 08:05

## 2022-05-26 RX ADMIN — OXYMETAZOLINE HCL 2 SPRAY: 0.05 SPRAY NASAL at 10:05

## 2022-05-26 RX ADMIN — AMIODARONE HYDROCHLORIDE 200 MG: 200 TABLET ORAL at 10:05

## 2022-05-26 RX ADMIN — BUPROPION HYDROCHLORIDE 300 MG: 300 TABLET, FILM COATED, EXTENDED RELEASE ORAL at 10:05

## 2022-05-26 RX ADMIN — SODIUM CHLORIDE, SODIUM LACTATE, POTASSIUM CHLORIDE, AND CALCIUM CHLORIDE 1000 ML: .6; .31; .03; .02 INJECTION, SOLUTION INTRAVENOUS at 12:05

## 2022-05-26 RX ADMIN — METOPROLOL SUCCINATE 25 MG: 25 TABLET, EXTENDED RELEASE ORAL at 10:05

## 2022-05-26 RX ADMIN — OXYMETAZOLINE HCL 2 SPRAY: 0.05 SPRAY NASAL at 08:05

## 2022-05-26 RX ADMIN — RANOLAZINE 500 MG: 500 TABLET, EXTENDED RELEASE ORAL at 08:05

## 2022-05-26 RX ADMIN — RANOLAZINE 500 MG: 500 TABLET, EXTENDED RELEASE ORAL at 10:05

## 2022-05-26 RX ADMIN — TAMSULOSIN HYDROCHLORIDE 0.4 MG: 0.4 CAPSULE ORAL at 10:05

## 2022-05-26 RX ADMIN — ISOSORBIDE MONONITRATE 30 MG: 30 TABLET, EXTENDED RELEASE ORAL at 10:05

## 2022-05-26 RX ADMIN — ATORVASTATIN CALCIUM 40 MG: 40 TABLET, FILM COATED ORAL at 10:05

## 2022-05-26 RX ADMIN — AMOXICILLIN AND CLAVULANATE POTASSIUM 500 MG: 500; 125 TABLET, FILM COATED ORAL at 08:05

## 2022-05-26 RX ADMIN — ISOSORBIDE MONONITRATE 30 MG: 30 TABLET, EXTENDED RELEASE ORAL at 08:05

## 2022-05-26 RX ADMIN — AMOXICILLIN AND CLAVULANATE POTASSIUM 500 MG: 500; 125 TABLET, FILM COATED ORAL at 02:05

## 2022-05-26 RX ADMIN — TRAVOPROST 1 DROP: 0.04 SOLUTION/ DROPS OPHTHALMIC at 09:05

## 2022-05-26 RX ADMIN — OXYMETAZOLINE HCL 2 SPRAY: 0.05 SPRAY NASAL at 03:05

## 2022-05-26 NOTE — HOSPITAL COURSE
Patient placed in observation for epistaxis management. ENT consulted and recommend afrin q4 hours and augmentin BID for prophylaxis while rapid rhino is in place. ENT also placed dissolvable fibrillar in the right nare and kept the rapid rhino in place in the left nare, will need to remove within 3-4 days. Hb decreased from 12.3 to 11.1, but stable. Patient reports no further episodes of bleeding, stable for discharge. Follow-up with ENT and PCP. Return precautions given and patient verbalized understanding.

## 2022-05-26 NOTE — ED NOTES
Pt set for discharge, but nose bleed restarted when pt attempted to ambulate to the restroom.  PA at bedside to add air to rhino rocket.

## 2022-05-26 NOTE — ED NOTES
I-STAT Chem-8+ Results:   Value Reference Range   Sodium 142 136-145 mmol/L   Potassium  4.0 3.5-5.1 mmol/L   Chloride 105  mmol/L   Ionized Calcium 1.28 1.06-1.42 mmol/L   CO2 (measured) 26 23-29 mmol/L   Glucose 122  mg/dL   BUN 32 6-30 mg/dL   Creatinine 1.5 0.5-1.4 mg/dL   Hematocrit 34 36-54%

## 2022-05-26 NOTE — ED NOTES
Telemetry Verification   Patient placed on Telemetry Box  Verified with War Room  Box # [id number]   Monitor Tech Jonathan   Rate 75   Rhythm NSR

## 2022-05-26 NOTE — ASSESSMENT & PLAN NOTE
- presents with intractable bilateral epistaxis s/p fall w/ head trauma  - rhino rocket placed in ED with hemostasis  - ENT consulted; appreciate recs  - NPO at MN incase any surgical intervention necessary in AM  - hold ASA, brillinta, and VTE ppx  - BP control  - trend CBC  - transfuse for Hgb >7  - please page ENT if signficant bleeding reoccurs overnight

## 2022-05-26 NOTE — PROVIDER PROGRESS NOTES - EMERGENCY DEPT.
Encounter Date: 5/25/2022    ED Physician Progress Notes        Physician Note:   Received patient at sign-out    Hemoglobin 12.3 on CBC.  Creatinine 1.6 without emergent abnormalities on CMP.  Unknown baseline.  On repeat assessment, patient resting comfortably.  He has a rhino rocket in left knee nare that is saturated.  There is no active bleeding visualized.  He notes that when he stands up he will bleed. Will place into observation as planned.

## 2022-05-26 NOTE — DISCHARGE INSTRUCTIONS
Follow-up in ENT clinic in the next few days for re-evaluation.  You may have slight oozing of blood from your nose with the nasal packing in place.   Return to the ER if you have heavy bleeding around the nasal packing, bleeding from the other nostril, weakness, lightheadedness, severe headache, fever greater than 100.4° or any other worrisome symptoms.  Follow-up with PCP on 5/30 to remove packing. OK to resume aspirin and Brilinta on 5/29.

## 2022-05-26 NOTE — ED NOTES
Pt informed that he may have to be admitted if his nose bleed does not stop.  Informed pt that he would need an iv in order to be admitted.  PT stated that he did not want to have an iv and was not going to allow the RN to place an IV.  PT educated on the need for intravenous access while pt is admitted.  Pt still refused.  Provider notified.

## 2022-05-26 NOTE — ED PROVIDER NOTES
Encounter Date: 5/25/2022       History     Chief Complaint   Patient presents with    Fall     TRIPPED AND FELL IN [facility],seen in er earlier,   had ct scan, hematoma to forehead, nose bleeding more     83 yo M with CAD, hx of CABG presnts to the ED with epistaxis.  Pt had a mechanical fall in the hospital today. He was evaluated in the ED and discharged about 2 hour prior to this presentation. Pt returns with heavy nosebleed. Daughter reports that the patient was discharged with Afrin as he was having a slight nosebleed.  The bleeding became heavier and would not stop.           Review of patient's allergies indicates:   Allergen Reactions    Ibuprofen      Causes nose bleed    Tylenol-codeine #3 [acetaminophen-codeine]      Upset stomach and dizzness     Past Medical History:   Diagnosis Date    Coronary artery disease      Past Surgical History:   Procedure Laterality Date    CORONARY ANGIOPLASTY      CORONARY ARTERY BYPASS GRAFT       Family History   Problem Relation Age of Onset    Heart disease Mother     Hypertension Mother     Heart disease Father     Hypertension Father     No Known Problems Sister     No Known Problems Brother     No Known Problems Maternal Aunt     No Known Problems Maternal Uncle     No Known Problems Paternal Aunt     No Known Problems Paternal Uncle     No Known Problems Maternal Grandmother     No Known Problems Maternal Grandfather     No Known Problems Paternal Grandmother     No Known Problems Paternal Grandfather     Anemia Neg Hx     Arrhythmia Neg Hx     Asthma Neg Hx     Clotting disorder Neg Hx     Fainting Neg Hx     Heart attack Neg Hx     Heart failure Neg Hx     Hyperlipidemia Neg Hx     Stroke Neg Hx     Atrial Septal Defect Neg Hx      Social History     Tobacco Use    Smoking status: Former Smoker    Smokeless tobacco: Never Used   Substance Use Topics    Alcohol use: Never    Drug use: Never     Review of Systems   Constitutional:  Negative for fever.   HENT: Positive for nosebleeds. Negative for sore throat.    Respiratory: Negative for shortness of breath.    Cardiovascular: Negative for chest pain.   Gastrointestinal: Negative for nausea.   Genitourinary: Negative for dysuria.   Musculoskeletal: Negative for back pain.   Skin: Negative for rash.   Neurological: Negative for weakness.   Hematological: Does not bruise/bleed easily.       Physical Exam     Initial Vitals [05/25/22 1809]   BP Pulse Resp Temp SpO2   (!) 175/87 67 18 97.5 °F (36.4 °C) 97 %      MAP       --         Physical Exam    Nursing note and vitals reviewed.  Constitutional: He appears well-developed and well-nourished.  Non-toxic appearance. He does not appear ill. No distress.   HENT:   Head: Normocephalic and atraumatic.   Persistent bleeding from L naris.  Source of bleeding possibly from the anterior L septum.    Neck: Neck supple.   Normal range of motion.  Cardiovascular: Normal rate and regular rhythm. Exam reveals no gallop, no distant heart sounds and no friction rub.    No murmur heard.  Pulmonary/Chest: Effort normal and breath sounds normal. No accessory muscle usage. No tachypnea. No respiratory distress. He has no decreased breath sounds. He has no wheezes. He has no rhonchi. He has no rales.   Abdominal: He exhibits no distension.   Musculoskeletal:      Cervical back: Normal range of motion and neck supple.     Neurological: He is alert.   Skin: No rash noted.         ED Course   Epistaxis Mgmt    Date/Time: 5/25/2022 8:37 PM  Performed by: Kellen Valdivia PA-C  Authorized by: Fatoumata Maldonado MD     Anesthesia:  Local Anesthetic: lidocaine 1% with epinephrine    Patient sedated: no  Treatment site: left anterior  Repair method: merocel sponge, oxymetazoline and Rhino Rocket  Treatment complexity: complex  Recurrence: recurrence of recent bleed  Patient tolerance: Patient tolerated the procedure well with no immediate complications  Comments: 1. ELISSA  gel with nasal packing with gauze --> unsuccessful  2. Lidocaine with epi with Merocel --> unsuccessful  3. Rhino rocket placed.         Labs Reviewed   ISTAT PROCEDURE - Abnormal; Notable for the following components:       Result Value    POC Glucose 122 (*)     POC BUN 32 (*)     POC Creatinine 1.5 (*)     POC Hematocrit 34 (*)     All other components within normal limits   CBC W/ AUTO DIFFERENTIAL   COMPREHENSIVE METABOLIC PANEL   ISTAT CHEM8          Imaging Results    None          Medications   LETS (LIDOcaine-TETRAcaine-EPINEPHrine) gel solution (has no administration in time range)   LETS (LIDOcaine-TETRAcaine-EPINEPHrine) gel solution ( Topical (Top) Given 5/25/22 2030)   LETS (LIDOcaine-TETRAcaine-EPINEPHrine) gel solution ( Topical (Top) Given 5/25/22 2210)   oxymetazoline 0.05 % nasal spray 1 spray (1 spray Each Nostril Given 5/25/22 2212)   LIDOcaine-EPINEPHrine (PF) 1%-1:200,000 injection 1 mL (1 mL Other Given 5/25/22 2211)     Medical Decision Making:   History:   Old Medical Records: I decided to obtain old medical records.  Initial Assessment:   81 yo M presents to the ED with epistaxis following facial trauma today.  HDS. Active L sided anterior epistaxis.  Differential Diagnosis:   My differential diagnosis includes but is not limited to:  Anterior epistaxis, posterior epistaxis, anemia  Clinical Tests:   Lab Tests: Ordered  ED Management:  Patient ultimately required rhino rocket nasal packing.  No evidence of anemia.  Discharged in stable condition.  Given prescription for Augmentin.  Advised to follow-up in ENT clinic in the next few days for re-evaluation and packing removal.  ED return precautions given.    I have reviewed the patient's records and discussed this case with my supervising physician.     9:43 PM  Pt sat up to go to the restroom prior to leaving the ED and had recurrence of bleeding past rhino rocket. Discussed with ENT.  I have administered additional Afrin alongside the  packing and re-inflated the rhino rocket balloon. Will consider Obs if bleeding recurs.   9:57 PM  Pt now bleeding from contralateral naris as well as some persistent oozing around left right now rocket.  Patient is agreeable to stay for observation.  I have reviewed the patient's records and discussed this case with my supervising physician.                 Attending Attestation:     Physician Attestation Statement for NP/PA:   I discussed this assessment and plan of this patient with the NP/PA, but I did not personally examine the patient. The face to face encounter was performed by the NP/PA.                       Clinical Impression:   Final diagnoses:  [R04.0] Epistaxis (Primary)          ED Disposition Condition    Observation               Kellen Valdivia PA-C  05/25/22 2110       Kellen Valdivia PA-C  05/25/22 2216       Fatoumata Maldonado MD  05/25/22 2217

## 2022-05-26 NOTE — SUBJECTIVE & OBJECTIVE
Interval History: Patient seen and examined today with daughter, Celena, at bedside. VSSAF. Episode of large volume hematemesis overnight due to swallowing blood from epistaxis. Patient also reports that movement exacerbates the bleeding and reports multiple episodes of bleeding overnight. ENT recommend Afrin q4 hours and Augmentin BID. Placed dissolvable fibrillar in the right nare. Rapid rhino in place on left side, will keep in place for 3-4 days. Hb decreased from 12.3 to 10.9, will monitor closely.    Review of Systems   Constitutional:  Negative for activity change, chills and fever.   HENT:  Positive for nosebleeds. Negative for sinus pain and trouble swallowing.    Eyes:  Negative for photophobia and visual disturbance.   Respiratory:  Negative for chest tightness, shortness of breath and wheezing.    Cardiovascular:  Negative for chest pain, palpitations and leg swelling.   Gastrointestinal:  Positive for nausea and vomiting. Negative for abdominal pain, constipation and diarrhea.   Genitourinary:  Negative for dysuria, frequency, hematuria and urgency.   Musculoskeletal:  Negative for arthralgias, back pain and gait problem.   Skin:  Positive for color change and wound. Negative for rash.   Neurological:  Positive for dizziness and light-headedness. Negative for syncope, weakness, numbness and headaches.   Hematological:  Bruises/bleeds easily.   Psychiatric/Behavioral:  Negative for agitation and confusion. The patient is not nervous/anxious.    Objective:     Vital Signs (Most Recent):  Temp: 98.8 °F (37.1 °C) (05/26/22 1310)  Pulse: 80 (05/26/22 1310)  Resp: 16 (05/26/22 1310)  BP: 134/80 (05/26/22 1310)  SpO2: 95 % (05/26/22 1310) Vital Signs (24h Range):  Temp:  [97.5 °F (36.4 °C)-98.8 °F (37.1 °C)] 98.8 °F (37.1 °C)  Pulse:  [56-80] 80  Resp:  [14-18] 16  SpO2:  [93 %-100 %] 95 %  BP: (110-179)/(67-87) 134/80     Weight: 92.1 kg (203 lb)  Body mass index is 30.87 kg/m².    Intake/Output Summary  (Last 24 hours) at 5/26/2022 1444  Last data filed at 5/26/2022 0614  Gross per 24 hour   Intake 50 ml   Output 400 ml   Net -350 ml      Physical Exam  Vitals and nursing note reviewed.   Constitutional:       General: He is not in acute distress.     Appearance: He is well-developed.   HENT:      Head: Normocephalic. Raccoon eyes present.      Comments: Laceration above R eyebrow s/p repair.      Mouth/Throat:      Pharynx: No oropharyngeal exudate.   Eyes:      Extraocular Movements: Extraocular movements intact.      Conjunctiva/sclera: Conjunctivae normal.   Cardiovascular:      Rate and Rhythm: Normal rate and regular rhythm.      Heart sounds: Normal heart sounds.   Pulmonary:      Effort: Pulmonary effort is normal. No respiratory distress.      Breath sounds: Normal breath sounds. No wheezing.   Abdominal:      General: Bowel sounds are normal. There is no distension.      Palpations: Abdomen is soft.      Tenderness: There is no abdominal tenderness.   Musculoskeletal:         General: No tenderness. Normal range of motion.      Cervical back: Normal range of motion and neck supple.   Lymphadenopathy:      Cervical: No cervical adenopathy.   Skin:     General: Skin is warm and dry.      Capillary Refill: Capillary refill takes less than 2 seconds.      Findings: Bruising present. No rash.   Neurological:      Mental Status: He is alert and oriented to person, place, and time.      Cranial Nerves: No cranial nerve deficit.      Sensory: No sensory deficit.      Coordination: Coordination normal.   Psychiatric:         Behavior: Behavior normal.         Thought Content: Thought content normal.         Judgment: Judgment normal.       Significant Labs: All pertinent labs within the past 24 hours have been reviewed.  CBC:   Recent Labs   Lab 05/25/22  2235 05/26/22  0417 05/26/22  0820   WBC 7.00 7.78 8.27   HGB 12.3* 11.1* 10.9*   HCT 35.8* 32.5* 32.9*   * 127* 129*     CMP:   Recent Labs   Lab  05/25/22  2235 05/26/22  0417    142   K 4.2 4.2    106   CO2 24 24   * 121*   BUN 39* 51*   CREATININE 1.6* 1.3   CALCIUM 9.5 9.4   PROT 6.5  --    ALBUMIN 3.5  --    BILITOT 0.9  --    ALKPHOS 45*  --    AST 15  --    ALT 11  --    ANIONGAP 10 12   EGFRNONAA 39.8* 51.2*       Significant Imaging: I have reviewed all pertinent imaging results/findings within the past 24 hours.

## 2022-05-26 NOTE — ASSESSMENT & PLAN NOTE
- keep BP <130/90 to avoid increased risk of recurrent epistaxis   - continue imdur 30 BID and MTP 25mg daily

## 2022-05-26 NOTE — ASSESSMENT & PLAN NOTE
82 year-old with CAD (on DAPT) who is admitted for persistent epistaxis following a fall in the [facility] yesterday afternoon. Currently with a RapidRhino in the left naris. Right naris packed with dissolvable Fibrillar.    -- Afrin scheduled Q4hr to bilateral nares for the next 3-4 days as long as the RapidRhino is in-place  -- Antistaphylococcal antibiotics while RapidRhino is in-place  -- Afrin and manual pressure for 10 minutes for any further bleeding  -- Ok to discharge per epistaxis perspective; patient will need local follow up for RapidRhino removal in 3-4 days  -- Please Secure Chat me for any questions, page ENT on-call if unresponsive or urgent

## 2022-05-26 NOTE — HPI
Mr. Scales is an 82 year-old male who had a fall [facility] the [facility] yesterday while waiting for his wife's surgery to finish. He had a forehead laceration that was sutured in the ED. He was also given some Afrin for a slow epistaxis. Later last night, patient's epistaxis became progressively worse and he went to the ED and had his left naris packed with RapidRhino. He was deemed stable to discharge but upon further evaluation, was persistently bleeding around the RapidRhino and also on the contralateral side that was controlled with Afrin and manual pressure. The patient was admitted to hospital medicine for overnight observation.

## 2022-05-26 NOTE — PROGRESS NOTES
[facility] - Telemetry Stepdown ([address] Medicine  Progress Note    Patient Name: Giacomo Scales  MRN: [id number]  Patient Class: OP- Observation   Admission Date: 5/25/2022  Length of Stay: 0 days  Attending Physician: Josiah Weiss MD  Primary Care Provider: Carlitos Eagle MD        Subjective:     Principal Problem:Epistaxis        HPI:  Giacomo Scales is a 82 y.o. male with a PMHx of HTN, CAD s/p CABG, anxiety who presents to [facility] with intractable epistaxis s/p fall with head trauma that occurred earlier today. Patient tripped over his feet causing him to fall onto his face earlier today. He was seen in the ED at had a lac repaired. He had a mild nosebleed at that time which was controlled with afrin so he was discharged home with afrin. Shortly after returning home, patient began having a heavy nosebleed from left nares which did not respond with afrin so he returned to the ED. Bleeding was initially only from the L nare for which a rhino rocket was placed with hemostasis.  Planned for discharge from the ED, but patient had recurrent bleeding around the rhino rocket right before leaving the ED. He also eventually began to have bleeding from the R nare. Gauze packing with LETS gel in the R nare. ED provider discussed with ENT who do not recommend any further intervention right now and plan to see patient in AM.     Shortly before my exam, patient had an episode of hematemesis while in the ED. Discussed with ENT who agree that hematemesis is 2/2 swallowing blood from bleeding nose throughout the day. Nothing to do from ENT standpoint tonight, they plan to formally evaluate patient in AM. Advised patient to stay in bed and rest as bleeding reoccurs when standing/ exerting himself. Currently HDS, Hgb stable at 12.3.      Overview/Hospital Course:  Patient placed in observation for epistaxis management. ENT consulted and recommend afrin q4 hours and augmentin BID for prophylaxis. ENT also  placed dissolvable fibrillar in the right nare and kept the rapid rhino in place in the left nare, will need to remove within 3-4 days. Hb decreased from 12.3 to 10.9.      Interval History: Patient seen and examined today with daughter, Celena, at bedside. VSSAF. Episode of large volume hematemesis overnight due to swallowing blood from epistaxis. Patient also reports that movement exacerbates the bleeding and reports multiple episodes of bleeding overnight. ENT recommend Afrin q4 hours and Augmentin BID. Placed dissolvable fibrillar in the right nare. Rapid rhino in place on left side, will keep in place for 3-4 days. Hb decreased from 12.3 to 10.9, will monitor closely.    Review of Systems   Constitutional:  Negative for activity change, chills and fever.   HENT:  Positive for nosebleeds. Negative for sinus pain and trouble swallowing.    Eyes:  Negative for photophobia and visual disturbance.   Respiratory:  Negative for chest tightness, shortness of breath and wheezing.    Cardiovascular:  Negative for chest pain, palpitations and leg swelling.   Gastrointestinal:  Positive for nausea and vomiting. Negative for abdominal pain, constipation and diarrhea.   Genitourinary:  Negative for dysuria, frequency, hematuria and urgency.   Musculoskeletal:  Negative for arthralgias, back pain and gait problem.   Skin:  Positive for color change and wound. Negative for rash.   Neurological:  Positive for dizziness and light-headedness. Negative for syncope, weakness, numbness and headaches.   Hematological:  Bruises/bleeds easily.   Psychiatric/Behavioral:  Negative for agitation and confusion. The patient is not nervous/anxious.    Objective:     Vital Signs (Most Recent):  Temp: 98.8 °F (37.1 °C) (05/26/22 1310)  Pulse: 80 (05/26/22 1310)  Resp: 16 (05/26/22 1310)  BP: 134/80 (05/26/22 1310)  SpO2: 95 % (05/26/22 1310) Vital Signs (24h Range):  Temp:  [97.5 °F (36.4 °C)-98.8 °F (37.1 °C)] 98.8 °F (37.1 °C)  Pulse:   [56-80] 80  Resp:  [14-18] 16  SpO2:  [93 %-100 %] 95 %  BP: (110-179)/(67-87) 134/80     Weight: 92.1 kg (203 lb)  Body mass index is 30.87 kg/m².    Intake/Output Summary (Last 24 hours) at 5/26/2022 1444  Last data filed at 5/26/2022 0614  Gross per 24 hour   Intake 50 ml   Output 400 ml   Net -350 ml      Physical Exam  Vitals and nursing note reviewed.   Constitutional:       General: He is not in acute distress.     Appearance: He is well-developed.   HENT:      Head: Normocephalic. Raccoon eyes present.      Comments: Laceration above R eyebrow s/p repair.      Mouth/Throat:      Pharynx: No oropharyngeal exudate.   Eyes:      Extraocular Movements: Extraocular movements intact.      Conjunctiva/sclera: Conjunctivae normal.   Cardiovascular:      Rate and Rhythm: Normal rate and regular rhythm.      Heart sounds: Normal heart sounds.   Pulmonary:      Effort: Pulmonary effort is normal. No respiratory distress.      Breath sounds: Normal breath sounds. No wheezing.   Abdominal:      General: Bowel sounds are normal. There is no distension.      Palpations: Abdomen is soft.      Tenderness: There is no abdominal tenderness.   Musculoskeletal:         General: No tenderness. Normal range of motion.      Cervical back: Normal range of motion and neck supple.   Lymphadenopathy:      Cervical: No cervical adenopathy.   Skin:     General: Skin is warm and dry.      Capillary Refill: Capillary refill takes less than 2 seconds.      Findings: Bruising present. No rash.   Neurological:      Mental Status: He is alert and oriented to person, place, and time.      Cranial Nerves: No cranial nerve deficit.      Sensory: No sensory deficit.      Coordination: Coordination normal.   Psychiatric:         Behavior: Behavior normal.         Thought Content: Thought content normal.         Judgment: Judgment normal.       Significant Labs: All pertinent labs within the past 24 hours have been reviewed.  CBC:   Recent Labs    Lab 05/25/22 2235 05/26/22  0417 05/26/22  0820   WBC 7.00 7.78 8.27   HGB 12.3* 11.1* 10.9*   HCT 35.8* 32.5* 32.9*   * 127* 129*     CMP:   Recent Labs   Lab 05/25/22 2235 05/26/22  0417    142   K 4.2 4.2    106   CO2 24 24   * 121*   BUN 39* 51*   CREATININE 1.6* 1.3   CALCIUM 9.5 9.4   PROT 6.5  --    ALBUMIN 3.5  --    BILITOT 0.9  --    ALKPHOS 45*  --    AST 15  --    ALT 11  --    ANIONGAP 10 12   EGFRNONAA 39.8* 51.2*       Significant Imaging: I have reviewed all pertinent imaging results/findings within the past 24 hours.      Assessment/Plan:      * Epistaxis  - presents with intractable bilateral epistaxis s/p fall w/ head trauma  - rhino rocket placed in ED with hemostasis  - ENT consulted; appreciate recs   - recommend Afrin q4 hours and Augmentin BID   - placed dissolvable  the right nare, keep rapid rhino in place on left side - to be removed in 3-4 days  - hold ASA, brillinta, and VTE ppx  - BP control  - trend CBC  - transfuse for Hgb >7  - please page ENT if signficant bleeding reoccurs overnight    Hypertension  - keep BP <130/90 to avoid increased risk of recurrent epistaxis   - continue imdur 30 BID and MTP 25mg daily     Anxiety  - continue Wellbutrin     HLD (hyperlipidemia)  - continue statin     Coronary artery disease involving native heart  S/p CABG  - hold ASA and brillianta given epistaxis   - continue statin       VTE Risk Mitigation (From admission, onward)         Ordered     IP VTE HIGH RISK PATIENT  Once         05/25/22 2345     Reason for No Pharmacological VTE Prophylaxis  Once        Question:  Reasons:  Answer:  Active Bleeding    05/25/22 2345                Discharge Planning   KENNETH: 5/27/2022     Code Status: Full Code   Is the patient medically ready for discharge?: No    Reason for patient still in hospital (select all that apply): Patient trending condition, Laboratory test and Treatment             Discussed patient's plan of care  with Dr. Abhishek Mills PA-C  Department of Hospital Medicine   [facility] - Telemetry Stepdown ([place])

## 2022-05-26 NOTE — CONSULTS
[facility] - Tele[facility]down ([place] [id number])  Otorhinolaryngology-Head & Neck Surgery  Consult Note    Patient Name: Giacomo Scales  MRN: [id number]  Code Status: Full Code  Admission Date: 5/25/2022  Hospital Length of Stay: 0 days  Attending Physician: Josiah Weiss MD  Primary Care Provider: Carlitos Eagle MD    Patient information was obtained from patient, relative(s) and past medical records.     Inpatient consult to ENT  Consult performed by: Adithya Louie MD  Consult ordered by: Isatu Mills PA-C        Subjective:     Chief Complaint/Reason for Admission: epistaxis    History of Present Illness: Mr. Scales is an 82 year-old male who had a fall [facility] the [facility] yesterday while waiting for his wife's surgery to finish. He had a forehead laceration that was sutured in the ED. He was also given some Afrin for a slow epistaxis. Later last night, patient's epistaxis became progressively worse and he went to the ED and had his left naris packed with RapidRhino. He was deemed stable to discharge but upon further evaluation, was persistently bleeding around the RapidRhino and also on the contralateral side that was controlled with Afrin and manual pressure. The patient was admitted to hospital medicine for overnight observation.       Medications:  Continuous Infusions:  Scheduled Meds:   amiodarone  200 mg Oral Daily    amoxicillin-clavulanate 500-125mg  1 tablet Oral BID    atorvastatin  40 mg Oral Daily    buPROPion  300 mg Oral Daily    isosorbide mononitrate  30 mg Oral BID    metoprolol succinate  25 mg Oral BID    oxymetazoline  2 spray Each Nostril Q4H    ranolazine  500 mg Oral BID    tamsulosin  0.4 mg Oral Daily    travoprost  1 drop Both Eyes QHS     PRN Meds:acetaminophen, albuterol-ipratropium, bisacodyL, dextrose 10%, dextrose 10%, glucagon (human recombinant), glucose, glucose, melatonin, ondansetron, polyethylene glycol, promethazine     Current Facility-Administered  Medications on File Prior to Encounter   Medication    [COMPLETED] oxymetazoline 0.05 % nasal spray 1 spray     Current Outpatient Medications on File Prior to Encounter   Medication Sig    alfuzosin (UROXATRAL) 10 mg Tb24 Take 10 mg by mouth daily with breakfast.     amiodarone (PACERONE) 200 MG Tab Take 200 mg by mouth once daily.     ascorbic acid, vitamin C, (VITAMIN C) 500 MG tablet Take 1,000 mg by mouth once daily.    aspirin (ECOTRIN) 81 MG EC tablet Take 81 mg by mouth once daily.    BRILINTA 90 mg tablet Take 90 mg by mouth 2 (two) times daily.    buPROPion (WELLBUTRIN XL) 300 MG 24 hr tablet Take 300 mg by mouth once daily.     cholecalciferol, vitamin D3, (VITAMIN D3) 125 mcg (5,000 unit) Tab Take 5,000 Units by mouth once daily.    folic acid/multivit-min/lutein (CENTRUM SILVER ORAL) Take 1 tablet by mouth once daily.    glucosamine-chondroitin 500-400 mg tablet Take 1 tablet by mouth once daily.    isosorbide mononitrate (IMDUR) 30 MG 24 hr tablet Take 1 tablet (30 mg total) by mouth 2 (two) times a day.    metoprolol succinate (TOPROL-XL) 25 MG 24 hr tablet Take 1 tablet (25 mg total) by mouth 2 (two) times daily.    nitroGLYCERIN (NITROSTAT) 0.4 MG SL tablet Place 1 tablet (0.4 mg total) under the tongue every 5 (five) minutes as needed for Chest pain.    omega-3 fatty acids/fish oil (FISH OIL-OMEGA-3 FATTY ACIDS) 300-1,000 mg capsule Take 1 capsule by mouth once daily.    ranolazine (RANEXA) 500 MG Tb12 Take 500 mg by mouth 2 (two) times daily.    rosuvastatin (CRESTOR) 40 MG Tab Take 40 mg by mouth every evening.     travoprost (TRAVATAN Z) 0.004 % ophthalmic solution Place 1 drop into both eyes every evening.        Review of patient's allergies indicates:   Allergen Reactions    Ibuprofen      Causes nose bleed    Tylenol-codeine #3 [acetaminophen-codeine]      Upset stomach and dizzness     Past Medical History:   Diagnosis Date    Coronary artery disease      Past Surgical  History:   Procedure Laterality Date    CORONARY ANGIOPLASTY      CORONARY ARTERY BYPASS GRAFT       Family History       Problem Relation (Age of Onset)    Heart disease Mother, Father    Hypertension Mother, Father    No Known Problems Sister, Brother, Maternal Aunt, Maternal Uncle, Paternal Aunt, Paternal Uncle, Maternal Grandmother, Maternal Grandfather, Paternal Grandmother, Paternal Grandfather          Tobacco Use    Smoking status: Former Smoker    Smokeless tobacco: Never Used   Substance and Sexual Activity    Alcohol use: Never    Drug use: Never    Sexual activity: Not on file     Review of Systems  Objective:     Vital Signs (Most Recent):  Temp: 98.8 °F (37.1 °C) (05/26/22 1310)  Pulse: 80 (05/26/22 1310)  Resp: 16 (05/26/22 1310)  BP: 134/80 (05/26/22 1310)  SpO2: 95 % (05/26/22 1310) Vital Signs (24h Range):  Temp:  [97.5 °F (36.4 °C)-98.8 °F (37.1 °C)] 98.8 °F (37.1 °C)  Pulse:  [56-80] 80  Resp:  [14-18] 16  SpO2:  [93 %-97 %] 95 %  BP: (110-179)/(67-87) 134/80     Weight: 92.1 kg (203 lb)  Body mass index is 30.87 kg/m².    Physical Exam  Resting on bed, in no acute distress  Bilateral ecchymosis of the lower eyelid  EOMI   Left naris packed with RapidRhino, dried blood circumferentially  Right naris dry, unable to see into nasopharynx from the septum pushed over by the RapidRhino on the left; left naris packed with Fibrillar and sprayed with Afrin  Oropharynx with small dried blood clot, suctioned adequately    Significant Labs:  BMP:   Recent Labs   Lab 05/26/22  0417   *      CO2 24   BUN 51*   CREATININE 1.3   CALCIUM 9.4   MG 1.7     CBC:   Recent Labs   Lab 05/26/22  0820   WBC 8.27   RBC 3.20*   HGB 10.9*   HCT 32.9*   *   *   MCH 34.1*   MCHC 33.1     Significant Diagnostics:  None    Assessment/Plan:     * Epistaxis  82 year-old with CAD (on DAPT) who is admitted for persistent epistaxis following a fall in the [facility] yesterday afternoon.  Currently with a RapidRhino in the left naris. Right naris packed with dissolvable Fibrillar.    -- Afrin scheduled Q4hr to bilateral nares for the next 3-4 days as long as the RapidRhino is in-place  -- Antistaphylococcal antibiotics while RapidRhino is in-place  -- Afrin and manual pressure for 10 minutes for any further bleeding  -- Ok to discharge per epistaxis perspective; patient will need local follow up for RapidRhino removal in 3-4 days  -- Please Secure Chat me for any questions, page ENT on-call if unresponsive or urgent      VTE Risk Mitigation (From admission, onward)         Ordered     IP VTE HIGH RISK PATIENT  Once         05/25/22 2345     Reason for No Pharmacological VTE Prophylaxis  Once        Question:  Reasons:  Answer:  Active Bleeding    05/25/22 2345              Thank you for your consult. I will sign off. Please contact us if you have any additional questions.    Adithya Louie MD  Otorhinolaryngology-Head & Neck Surgery  [facility] - Tele[facility] ([place])

## 2022-05-26 NOTE — HPI
Giacomo Scales is a 82 y.o. male with a PMHx of HTN, CAD s/p CABG, anxiety who [place] [facility] s/p fall with head trauma that occurred earlier today. Patient tripped over his feet causing him to fall onto his face earlier today. He was seen in the ED at had a lac repaired. He had a mild nosebleed at that time which was controlled with afrin so he was discharged home with afrin. Shortly after returning home, patient began having a heavy nosebleed from left nares which did not respond with afrin so he returned to the ED. Bleeding was initially only from the L nare for which a rhino rocket was placed with hemostasis.  Planned for discharge from the ED, but patient had recurrent bleeding around the rhino rocket right before leaving the ED. He also eventually began to have bleeding from the R nare. Gauze packing with LETS gel in the R nare. ED provider discussed with ENT who do not recommend any further intervention right now and plan to see patient in AM.     Shortly before my exam, patient had an episode of hematemesis while in the ED. Discussed with ENT who agree that hematemesis is 2/2 swallowing blood from bleeding nose throughout the day. Nothing to do from ENT standpoint tonight, they plan to formally evaluate patient in AM. Advised patient to stay in bed and rest as bleeding reoccurs when standing/ exerting himself. Currently HDS, Hgb stable at 12.3.

## 2022-05-26 NOTE — H&P
[facility] - Emergency Dept  [facility] Medicine  History & Physical    Patient Name: Giacomo Scales  MRN: [id number]  Patient Class: OP- Observation  Admission Date: 5/25/2022  Attending Physician: Diandra Madden MD   Primary Care Provider: Carlitos Eagle MD         Patient information was obtained from patient, past medical records and ER records.     Subjective:     Principal Problem:Epistaxis    Chief Complaint:   Chief Complaint   Patient presents with    Fall     TRIPPED AND FELL IN [facility],seen in er earlier,   had ct scan, hematoma to forehead, nose bleeding more        HPI: Giacomo Scales is a 82 y.o. male with a PMHx of HTN, CAD s/p CABG, anxiety who presents to [facility] with intractable epistaxis s/p fall with head trauma that occurred earlier today. Patient tripped over his feet causing him to fall onto his face earlier today. He was seen in the ED at had a lac repaired. He had a mild nosebleed at that time which was controlled with afrin so he was discharged home with afrin. Shortly after returning home, patient began having a heavy nosebleed from left nares which did not respond with afrin so he returned to the ED. Bleeding was initially only from the L nare for which a rhino rocket was placed with hemostasis.  Planned for discharge from the ED, but patient had recurrent bleeding around the rhino rocket right before leaving the ED. He also eventually began to have bleeding from the R nare. Gauze packing with LETS gel in the R nare. ED provider discussed with ENT who do not recommend any further intervention right now and plan to see patient in AM.     Shortly before my exam, patient had an episode of hematemesis while in the ED. Discussed with ENT who agree that hematemesis is 2/2 swallowing blood from bleeding nose throughout the day. Nothing to do from ENT standpoint tonight, they plan to formally evaluate patient in AM. Advised patient to stay in bed and rest as bleeding reoccurs when standing/  exerting himself. Currently HDS, Hgb stable at 12.3.      Past Medical History:   Diagnosis Date    Coronary artery disease        Past Surgical History:   Procedure Laterality Date    CORONARY ANGIOPLASTY      CORONARY ARTERY BYPASS GRAFT         Review of patient's allergies indicates:   Allergen Reactions    Ibuprofen      Causes nose bleed    Tylenol-codeine #3 [acetaminophen-codeine]      Upset stomach and dizzness       Current Facility-Administered Medications on File Prior to Encounter   Medication    [COMPLETED] oxymetazoline 0.05 % nasal spray 1 spray     Current Outpatient Medications on File Prior to Encounter   Medication Sig    alfuzosin (UROXATRAL) 10 mg Tb24 Take 10 mg by mouth daily with breakfast.     amiodarone (PACERONE) 200 MG Tab Take 200 mg by mouth once daily.     ascorbic acid, vitamin C, (VITAMIN C) 500 MG tablet Take 1,000 mg by mouth once daily.    aspirin (ECOTRIN) 81 MG EC tablet Take 81 mg by mouth once daily.    BRILINTA 90 mg tablet Take 90 mg by mouth 2 (two) times daily.    buPROPion (WELLBUTRIN XL) 300 MG 24 hr tablet Take 300 mg by mouth once daily.     cholecalciferol, vitamin D3, (VITAMIN D3) 125 mcg (5,000 unit) Tab Take 5,000 Units by mouth once daily.    folic acid/multivit-min/lutein (CENTRUM SILVER ORAL) Take 1 tablet by mouth once daily.    glucosamine-chondroitin 500-400 mg tablet Take 1 tablet by mouth once daily.    isosorbide mononitrate (IMDUR) 30 MG 24 hr tablet Take 1 tablet (30 mg total) by mouth 2 (two) times a day.    metoprolol succinate (TOPROL-XL) 25 MG 24 hr tablet Take 1 tablet (25 mg total) by mouth 2 (two) times daily.    nitroGLYCERIN (NITROSTAT) 0.4 MG SL tablet Place 1 tablet (0.4 mg total) under the tongue every 5 (five) minutes as needed for Chest pain.    omega-3 fatty acids/fish oil (FISH OIL-OMEGA-3 FATTY ACIDS) 300-1,000 mg capsule Take 1 capsule by mouth once daily.    ranolazine (RANEXA) 500 MG Tb12 Take 500 mg by mouth 2  (two) times daily.    rosuvastatin (CRESTOR) 40 MG Tab Take 40 mg by mouth every evening.     travoprost (TRAVATAN Z) 0.004 % ophthalmic solution Place 1 drop into both eyes every evening.      Family History       Problem Relation (Age of Onset)    Heart disease Mother, Father    Hypertension Mother, Father    No Known Problems Sister, Brother, Maternal Aunt, Maternal Uncle, Paternal Aunt, Paternal Uncle, Maternal Grandmother, Maternal Grandfather, Paternal Grandmother, Paternal Grandfather          Tobacco Use    Smoking status: Former Smoker    Smokeless tobacco: Never Used   Substance and Sexual Activity    Alcohol use: Never    Drug use: Never    Sexual activity: Not on file     Review of Systems   Constitutional:  Negative for activity change, chills and fever.   HENT:  Positive for nosebleeds. Negative for sinus pain and trouble swallowing.    Eyes:  Negative for photophobia and visual disturbance.   Respiratory:  Negative for chest tightness, shortness of breath and wheezing.    Cardiovascular:  Negative for chest pain, palpitations and leg swelling.   Gastrointestinal:  Positive for nausea and vomiting. Negative for abdominal pain, constipation and diarrhea.   Genitourinary:  Negative for dysuria, frequency, hematuria and urgency.   Musculoskeletal:  Negative for arthralgias, back pain and gait problem.   Skin:  Positive for color change and wound. Negative for rash.   Neurological:  Positive for dizziness and light-headedness. Negative for syncope, weakness, numbness and headaches.   Hematological:  Bruises/bleeds easily.   Psychiatric/Behavioral:  Negative for agitation and confusion. The patient is not nervous/anxious.    Objective:     Vital Signs (Most Recent):  Temp: 97.5 °F (36.4 °C) (05/25/22 1809)  Pulse: 68 (05/25/22 2100)  Resp: 14 (05/25/22 2000)  BP: 110/67 (05/25/22 2100)  SpO2: (!) 94 % (05/25/22 2100)   Vital Signs (24h Range):  Temp:  [97.5 °F (36.4 °C)] 97.5 °F (36.4 °C)  Pulse:   [56-74] 68  Resp:  [14-18] 14  SpO2:  [93 %-100 %] 94 %  BP: (110-179)/(67-87) 110/67     Weight: 92.1 kg (203 lb)  Body mass index is 30.87 kg/m².    Physical Exam  Vitals and nursing note reviewed.   Constitutional:       General: He is not in acute distress.     Appearance: He is well-developed.   HENT:      Head: Normocephalic. Raccoon eyes present.      Comments: Laceration above R eyebrow s/p repair.      Mouth/Throat:      Pharynx: No oropharyngeal exudate.   Eyes:      Extraocular Movements: Extraocular movements intact.      Conjunctiva/sclera: Conjunctivae normal.   Cardiovascular:      Rate and Rhythm: Normal rate and regular rhythm.      Heart sounds: Normal heart sounds.   Pulmonary:      Effort: Pulmonary effort is normal. No respiratory distress.      Breath sounds: Normal breath sounds. No wheezing.   Abdominal:      General: Bowel sounds are normal. There is no distension.      Palpations: Abdomen is soft.      Tenderness: There is no abdominal tenderness.   Musculoskeletal:         General: No tenderness. Normal range of motion.      Cervical back: Normal range of motion and neck supple.   Lymphadenopathy:      Cervical: No cervical adenopathy.   Skin:     General: Skin is warm and dry.      Capillary Refill: Capillary refill takes less than 2 seconds.      Findings: Bruising present. No rash.   Neurological:      Mental Status: He is alert and oriented to person, place, and time.      Cranial Nerves: No cranial nerve deficit.      Sensory: No sensory deficit.      Coordination: Coordination normal.   Psychiatric:         Behavior: Behavior normal.         Thought Content: Thought content normal.         Judgment: Judgment normal.           Significant Labs: All pertinent labs within the past 24 hours have been reviewed.  CBC:   Recent Labs   Lab 05/25/22 2046 05/25/22 2235   WBC  --  7.00   HGB  --  12.3*   HCT 34* 35.8*   PLT  --  121*     CMP:   Recent Labs   Lab 05/25/22 2235      K  4.2      CO2 24   *   BUN 39*   CREATININE 1.6*   CALCIUM 9.5   PROT 6.5   ALBUMIN 3.5   BILITOT 0.9   ALKPHOS 45*   AST 15   ALT 11   ANIONGAP 10   EGFRNONAA 39.8*       Significant Imaging: I have reviewed all pertinent imaging results/findings within the past 24 hours.    Assessment/Plan:     * Epistaxis  - presents with intractable bilateral epistaxis s/p fall w/ head trauma  - rhino rocket placed in ED with hemostasis  - ENT consulted; appreciate recs  - NPO at MN incase any surgical intervention necessary in AM  - hold ASA, brillinta, and VTE ppx  - BP control  - trend CBC  - transfuse for Hgb >7  - please page ENT if signficant bleeding reoccurs overnight    Hypertension  - keep BP <130/90 to avoid increased risk of recurrent epistaxis   - continue imdur 30 BID and MTP 25mg daily     Anxiety  - continue Wellbutrin     HLD (hyperlipidemia)  - continue statin     Coronary artery disease involving native heart  S/p CABG  - hold ASA and brillianta given epistaxis   - continue statin       VTE Risk Mitigation (From admission, onward)         Ordered     IP VTE HIGH RISK PATIENT  Once         05/25/22 2345     Reason for No Pharmacological VTE Prophylaxis  Once        Question:  Reasons:  Answer:  Active Bleeding    05/25/22 2345                   Veronika Vaca PA-C  Department of Hospital Medicine   [facility] - Emergency Dept

## 2022-05-26 NOTE — SUBJECTIVE & OBJECTIVE
Medications:  Continuous Infusions:  Scheduled Meds:   amiodarone  200 mg Oral Daily    amoxicillin-clavulanate 500-125mg  1 tablet Oral BID    atorvastatin  40 mg Oral Daily    buPROPion  300 mg Oral Daily    isosorbide mononitrate  30 mg Oral BID    metoprolol succinate  25 mg Oral BID    oxymetazoline  2 spray Each Nostril Q4H    ranolazine  500 mg Oral BID    tamsulosin  0.4 mg Oral Daily    travoprost  1 drop Both Eyes QHS     PRN Meds:acetaminophen, albuterol-ipratropium, bisacodyL, dextrose 10%, dextrose 10%, glucagon (human recombinant), glucose, glucose, melatonin, ondansetron, polyethylene glycol, promethazine     Current Facility-Administered Medications on File Prior to Encounter   Medication    [COMPLETED] oxymetazoline 0.05 % nasal spray 1 spray     Current Outpatient Medications on File Prior to Encounter   Medication Sig    alfuzosin (UROXATRAL) 10 mg Tb24 Take 10 mg by mouth daily with breakfast.     amiodarone (PACERONE) 200 MG Tab Take 200 mg by mouth once daily.     ascorbic acid, vitamin C, (VITAMIN C) 500 MG tablet Take 1,000 mg by mouth once daily.    aspirin (ECOTRIN) 81 MG EC tablet Take 81 mg by mouth once daily.    BRILINTA 90 mg tablet Take 90 mg by mouth 2 (two) times daily.    buPROPion (WELLBUTRIN XL) 300 MG 24 hr tablet Take 300 mg by mouth once daily.     cholecalciferol, vitamin D3, (VITAMIN D3) 125 mcg (5,000 unit) Tab Take 5,000 Units by mouth once daily.    folic acid/multivit-min/lutein (CENTRUM SILVER ORAL) Take 1 tablet by mouth once daily.    glucosamine-chondroitin 500-400 mg tablet Take 1 tablet by mouth once daily.    isosorbide mononitrate (IMDUR) 30 MG 24 hr tablet Take 1 tablet (30 mg total) by mouth 2 (two) times a day.    metoprolol succinate (TOPROL-XL) 25 MG 24 hr tablet Take 1 tablet (25 mg total) by mouth 2 (two) times daily.    nitroGLYCERIN (NITROSTAT) 0.4 MG SL tablet Place 1 tablet (0.4 mg total) under the tongue every 5 (five) minutes as needed for Chest  pain.    omega-3 fatty acids/fish oil (FISH OIL-OMEGA-3 FATTY ACIDS) 300-1,000 mg capsule Take 1 capsule by mouth once daily.    ranolazine (RANEXA) 500 MG Tb12 Take 500 mg by mouth 2 (two) times daily.    rosuvastatin (CRESTOR) 40 MG Tab Take 40 mg by mouth every evening.     travoprost (TRAVATAN Z) 0.004 % ophthalmic solution Place 1 drop into both eyes every evening.        Review of patient's allergies indicates:   Allergen Reactions    Ibuprofen      Causes nose bleed    Tylenol-codeine #3 [acetaminophen-codeine]      Upset stomach and dizzness     Past Medical History:   Diagnosis Date    Coronary artery disease      Past Surgical History:   Procedure Laterality Date    CORONARY ANGIOPLASTY      CORONARY ARTERY BYPASS GRAFT       Family History       Problem Relation (Age of Onset)    Heart disease Mother, Father    Hypertension Mother, Father    No Known Problems Sister, Brother, Maternal Aunt, Maternal Uncle, Paternal Aunt, Paternal Uncle, Maternal Grandmother, Maternal Grandfather, Paternal Grandmother, Paternal Grandfather          Tobacco Use    Smoking status: Former Smoker    Smokeless tobacco: Never Used   Substance and Sexual Activity    Alcohol use: Never    Drug use: Never    Sexual activity: Not on file     Review of Systems  Objective:     Vital Signs (Most Recent):  Temp: 98.8 °F (37.1 °C) (05/26/22 1310)  Pulse: 80 (05/26/22 1310)  Resp: 16 (05/26/22 1310)  BP: 134/80 (05/26/22 1310)  SpO2: 95 % (05/26/22 1310) Vital Signs (24h Range):  Temp:  [97.5 °F (36.4 °C)-98.8 °F (37.1 °C)] 98.8 °F (37.1 °C)  Pulse:  [56-80] 80  Resp:  [14-18] 16  SpO2:  [93 %-97 %] 95 %  BP: (110-179)/(67-87) 134/80     Weight: 92.1 kg (203 lb)  Body mass index is 30.87 kg/m².    Physical Exam  Resting on bed, in no acute distress  Bilateral ecchymosis of the lower eyelid  EOMI   Left naris packed with RapidRhino, dried blood circumferentially  Right naris dry, unable to see into nasopharynx from the septum pushed over  by the [place] on the left; left naris packed with Fibrillar and sprayed with Afrin  Oropharynx with small dried blood clot, suctioned adequately    Significant Labs:  BMP:   Recent Labs   Lab 05/26/22  0417   *      CO2 24   BUN 51*   CREATININE 1.3   CALCIUM 9.4   MG 1.7     CBC:   Recent Labs   Lab 05/26/22  0820   WBC 8.27   RBC 3.20*   HGB 10.9*   HCT 32.9*   *   *   MCH 34.1*   MCHC 33.1     Significant Diagnostics:  None

## 2022-05-26 NOTE — SUBJECTIVE & OBJECTIVE
Past Medical History:   Diagnosis Date    Coronary artery disease        Past Surgical History:   Procedure Laterality Date    CORONARY ANGIOPLASTY      CORONARY ARTERY BYPASS GRAFT         Review of patient's allergies indicates:   Allergen Reactions    Ibuprofen      Causes nose bleed    Tylenol-codeine #3 [acetaminophen-codeine]      Upset stomach and dizzness       Current Facility-Administered Medications on File Prior to Encounter   Medication    [COMPLETED] oxymetazoline 0.05 % nasal spray 1 spray     Current Outpatient Medications on File Prior to Encounter   Medication Sig    alfuzosin (UROXATRAL) 10 mg Tb24 Take 10 mg by mouth daily with breakfast.     amiodarone (PACERONE) 200 MG Tab Take 200 mg by mouth once daily.     ascorbic acid, vitamin C, (VITAMIN C) 500 MG tablet Take 1,000 mg by mouth once daily.    aspirin (ECOTRIN) 81 MG EC tablet Take 81 mg by mouth once daily.    BRILINTA 90 mg tablet Take 90 mg by mouth 2 (two) times daily.    buPROPion (WELLBUTRIN XL) 300 MG 24 hr tablet Take 300 mg by mouth once daily.     cholecalciferol, vitamin D3, (VITAMIN D3) 125 mcg (5,000 unit) Tab Take 5,000 Units by mouth once daily.    folic acid/multivit-min/lutein (CENTRUM SILVER ORAL) Take 1 tablet by mouth once daily.    glucosamine-chondroitin 500-400 mg tablet Take 1 tablet by mouth once daily.    isosorbide mononitrate (IMDUR) 30 MG 24 hr tablet Take 1 tablet (30 mg total) by mouth 2 (two) times a day.    metoprolol succinate (TOPROL-XL) 25 MG 24 hr tablet Take 1 tablet (25 mg total) by mouth 2 (two) times daily.    nitroGLYCERIN (NITROSTAT) 0.4 MG SL tablet Place 1 tablet (0.4 mg total) under the tongue every 5 (five) minutes as needed for Chest pain.    omega-3 fatty acids/fish oil (FISH OIL-OMEGA-3 FATTY ACIDS) 300-1,000 mg capsule Take 1 capsule by mouth once daily.    ranolazine (RANEXA) 500 MG Tb12 Take 500 mg by mouth 2 (two) times daily.    rosuvastatin (CRESTOR) 40 MG Tab Take 40 mg by mouth every  evening.     travoprost (TRAVATAN Z) 0.004 % ophthalmic solution Place 1 drop into both eyes every evening.      Family History       Problem Relation (Age of Onset)    Heart disease Mother, Father    Hypertension Mother, Father    No Known Problems Sister, Brother, Maternal Aunt, Maternal Uncle, Paternal Aunt, Paternal Uncle, Maternal Grandmother, Maternal Grandfather, Paternal Grandmother, Paternal Grandfather          Tobacco Use    Smoking status: Former Smoker    Smokeless tobacco: Never Used   Substance and Sexual Activity    Alcohol use: Never    Drug use: Never    Sexual activity: Not on file     Review of Systems   Constitutional:  Negative for activity change, chills and fever.   HENT:  Positive for nosebleeds. Negative for sinus pain and trouble swallowing.    Eyes:  Negative for photophobia and visual disturbance.   Respiratory:  Negative for chest tightness, shortness of breath and wheezing.    Cardiovascular:  Negative for chest pain, palpitations and leg swelling.   Gastrointestinal:  Positive for nausea and vomiting. Negative for abdominal pain, constipation and diarrhea.   Genitourinary:  Negative for dysuria, frequency, hematuria and urgency.   Musculoskeletal:  Negative for arthralgias, back pain and gait problem.   Skin:  Positive for color change and wound. Negative for rash.   Neurological:  Positive for dizziness and light-headedness. Negative for syncope, weakness, numbness and headaches.   Hematological:  Bruises/bleeds easily.   Psychiatric/Behavioral:  Negative for agitation and confusion. The patient is not nervous/anxious.    Objective:     Vital Signs (Most Recent):  Temp: 97.5 °F (36.4 °C) (05/25/22 1809)  Pulse: 68 (05/25/22 2100)  Resp: 14 (05/25/22 2000)  BP: 110/67 (05/25/22 2100)  SpO2: (!) 94 % (05/25/22 2100)   Vital Signs (24h Range):  Temp:  [97.5 °F (36.4 °C)] 97.5 °F (36.4 °C)  Pulse:  [56-74] 68  Resp:  [14-18] 14  SpO2:  [93 %-100 %] 94 %  BP: (110-179)/(67-87) 110/67      Weight: 92.1 kg (203 lb)  Body mass index is 30.87 kg/m².    Physical Exam  Vitals and nursing note reviewed.   Constitutional:       General: He is not in acute distress.     Appearance: He is well-developed.   HENT:      Head: Normocephalic. Raccoon eyes present.      Comments: Laceration above R eyebrow s/p repair.      Mouth/Throat:      Pharynx: No oropharyngeal exudate.   Eyes:      Extraocular Movements: Extraocular movements intact.      Conjunctiva/sclera: Conjunctivae normal.   Cardiovascular:      Rate and Rhythm: Normal rate and regular rhythm.      Heart sounds: Normal heart sounds.   Pulmonary:      Effort: Pulmonary effort is normal. No respiratory distress.      Breath sounds: Normal breath sounds. No wheezing.   Abdominal:      General: Bowel sounds are normal. There is no distension.      Palpations: Abdomen is soft.      Tenderness: There is no abdominal tenderness.   Musculoskeletal:         General: No tenderness. Normal range of motion.      Cervical back: Normal range of motion and neck supple.   Lymphadenopathy:      Cervical: No cervical adenopathy.   Skin:     General: Skin is warm and dry.      Capillary Refill: Capillary refill takes less than 2 seconds.      Findings: Bruising present. No rash.   Neurological:      Mental Status: He is alert and oriented to person, place, and time.      Cranial Nerves: No cranial nerve deficit.      Sensory: No sensory deficit.      Coordination: Coordination normal.   Psychiatric:         Behavior: Behavior normal.         Thought Content: Thought content normal.         Judgment: Judgment normal.           Significant Labs: All pertinent labs within the past 24 hours have been reviewed.  CBC:   Recent Labs   Lab 05/25/22 2046 05/25/22 2235   WBC  --  7.00   HGB  --  12.3*   HCT 34* 35.8*   PLT  --  121*     CMP:   Recent Labs   Lab 05/25/22 2235      K 4.2      CO2 24   *   BUN 39*   CREATININE 1.6*   CALCIUM 9.5   PROT 6.5    ALBUMIN 3.5   BILITOT 0.9   ALKPHOS 45*   AST 15   ALT 11   ANIONGAP 10   EGFRNONAA 39.8*       Significant Imaging: I have reviewed all pertinent imaging results/findings within the past 24 hours.

## 2022-05-26 NOTE — PLAN OF CARE
CM to pt's room for assessment completion. Physician with pt at bedside.    CM/SW staff will follow-up and assist team with discharge needs.    Katy Walker RN   - [facility]  [phone]

## 2022-05-26 NOTE — ASSESSMENT & PLAN NOTE
- presents with intractable bilateral epistaxis s/p fall w/ head trauma  - rhino rocket placed in ED with hemostasis  - ENT consulted; appreciate recs   - recommend Afrin q4 hours and Augmentin BID   - placed dissolvable  the right nare, keep rapid rhino in place on left side - to be removed in 3-4 days  - hold ASA, brillinta, and VTE ppx  - BP control  - trend CBC  - transfuse for Hgb >7  - please page ENT if signficant bleeding reoccurs overnight

## 2022-05-27 ENCOUNTER — TELEPHONE (OUTPATIENT)
Dept: OTOLARYNGOLOGY | Facility: CLINIC | Age: 82
End: 2022-05-27
Payer: MEDICARE

## 2022-05-27 VITALS
TEMPERATURE: 100 F | BODY MASS INDEX: 30.77 KG/M2 | HEART RATE: 76 BPM | OXYGEN SATURATION: 94 % | RESPIRATION RATE: 15 BRPM | SYSTOLIC BLOOD PRESSURE: 135 MMHG | HEIGHT: 68 IN | DIASTOLIC BLOOD PRESSURE: 73 MMHG | WEIGHT: 203 LBS

## 2022-05-27 LAB
ANION GAP SERPL CALC-SCNC: 11 MMOL/L (ref 8–16)
BUN SERPL-MCNC: 41 MG/DL (ref 8–23)
CALCIUM SERPL-MCNC: 9.4 MG/DL (ref 8.7–10.5)
CHLORIDE SERPL-SCNC: 110 MMOL/L (ref 95–110)
CO2 SERPL-SCNC: 22 MMOL/L (ref 23–29)
CREAT SERPL-MCNC: 1.2 MG/DL (ref 0.5–1.4)
EST. GFR  (AFRICAN AMERICAN): >60 ML/MIN/1.73 M^2
EST. GFR  (NON AFRICAN AMERICAN): 56.4 ML/MIN/1.73 M^2
GLUCOSE SERPL-MCNC: 126 MG/DL (ref 70–110)
MAGNESIUM SERPL-MCNC: 1.8 MG/DL (ref 1.6–2.6)
POCT GLUCOSE: 122 MG/DL (ref 70–110)
POCT GLUCOSE: 122 MG/DL (ref 70–110)
POTASSIUM SERPL-SCNC: 3.8 MMOL/L (ref 3.5–5.1)
SODIUM SERPL-SCNC: 143 MMOL/L (ref 136–145)

## 2022-05-27 PROCEDURE — 83735 ASSAY OF MAGNESIUM: CPT | Performed by: PHYSICIAN ASSISTANT

## 2022-05-27 PROCEDURE — 25000003 PHARM REV CODE 250: Performed by: PHYSICIAN ASSISTANT

## 2022-05-27 PROCEDURE — 25000003 PHARM REV CODE 250

## 2022-05-27 PROCEDURE — 36415 COLL VENOUS BLD VENIPUNCTURE: CPT | Performed by: PHYSICIAN ASSISTANT

## 2022-05-27 PROCEDURE — 99217 PR OBSERVATION CARE DISCHARGE: CPT | Mod: ,,,

## 2022-05-27 PROCEDURE — G0378 HOSPITAL OBSERVATION PER HR: HCPCS

## 2022-05-27 PROCEDURE — 99217 PR OBSERVATION CARE DISCHARGE: ICD-10-PCS | Mod: ,,,

## 2022-05-27 PROCEDURE — 80048 BASIC METABOLIC PNL TOTAL CA: CPT | Performed by: PHYSICIAN ASSISTANT

## 2022-05-27 RX ORDER — AMOXICILLIN AND CLAVULANATE POTASSIUM 875; 125 MG/1; MG/1
1 TABLET, FILM COATED ORAL 2 TIMES DAILY
Qty: 10 TABLET | Refills: 0 | Status: SHIPPED | OUTPATIENT
Start: 2022-05-27 | End: 2022-06-01

## 2022-05-27 RX ORDER — OXYMETAZOLINE HCL 0.05 %
2 SPRAY, NON-AEROSOL (ML) NASAL EVERY 4 HOURS
Qty: 30 ML | Refills: 0 | Status: SHIPPED | OUTPATIENT
Start: 2022-05-27 | End: 2022-05-30

## 2022-05-27 RX ADMIN — ISOSORBIDE MONONITRATE 30 MG: 30 TABLET, EXTENDED RELEASE ORAL at 08:05

## 2022-05-27 RX ADMIN — TAMSULOSIN HYDROCHLORIDE 0.4 MG: 0.4 CAPSULE ORAL at 08:05

## 2022-05-27 RX ADMIN — OXYMETAZOLINE HCL 2 SPRAY: 0.05 SPRAY NASAL at 06:05

## 2022-05-27 RX ADMIN — METOPROLOL SUCCINATE 25 MG: 25 TABLET, EXTENDED RELEASE ORAL at 08:05

## 2022-05-27 RX ADMIN — ATORVASTATIN CALCIUM 40 MG: 40 TABLET, FILM COATED ORAL at 08:05

## 2022-05-27 RX ADMIN — OXYMETAZOLINE HCL 2 SPRAY: 0.05 SPRAY NASAL at 02:05

## 2022-05-27 RX ADMIN — AMOXICILLIN AND CLAVULANATE POTASSIUM 500 MG: 500; 125 TABLET, FILM COATED ORAL at 08:05

## 2022-05-27 RX ADMIN — BUPROPION HYDROCHLORIDE 300 MG: 300 TABLET, FILM COATED, EXTENDED RELEASE ORAL at 08:05

## 2022-05-27 RX ADMIN — AMIODARONE HYDROCHLORIDE 200 MG: 200 TABLET ORAL at 08:05

## 2022-05-27 RX ADMIN — RANOLAZINE 500 MG: 500 TABLET, EXTENDED RELEASE ORAL at 08:05

## 2022-05-27 NOTE — NURSING
Patient d/c'd, AVS printed and reviewed with patient and daughter at bedside, verbalized understanding. IV access removed. Patient belongings packed. Meds delivered to patient via OP pharmacy, RN verified. Patient off unit via wheelchair accompanied by daughter. Patient awake and alert at time of d/c.

## 2022-05-27 NOTE — TELEPHONE ENCOUNTER
L/M FOR PT TO CALL AND SCHEDULE A APPOINTMENT THE SOONEST WE HAVE IS MONDAY AT [facility]

## 2022-05-27 NOTE — PLAN OF CARE
Problem: Adult Inpatient Plan of Care  Goal: Plan of Care Review  5/27/2022 1305 by Chris Mcgill RN  Outcome: Met  5/27/2022 1002 by Chris Mcgill RN  Outcome: Ongoing, Progressing  Goal: Patient-Specific Goal (Individualized)  5/27/2022 1305 by Chris Mcgill RN  Outcome: Met  5/27/2022 1002 by Chris Mcgill RN  Outcome: Ongoing, Progressing  Goal: Absence of Hospital-Acquired Illness or Injury  5/27/2022 1305 by Chris Mcgill RN  Outcome: Met  5/27/2022 1002 by Chris Mcgill RN  Outcome: Ongoing, Progressing  Goal: Optimal Comfort and Wellbeing  5/27/2022 1305 by Chris Mcgill RN  Outcome: Met  5/27/2022 1002 by Chris Mcgill RN  Outcome: Ongoing, Progressing  Goal: Readiness for Transition of Care  5/27/2022 1305 by Chris Mcgill RN  Outcome: Met  5/27/2022 1002 by Chris Mcgill RN  Outcome: Ongoing, Progressing     Problem: Infection  Goal: Absence of Infection Signs and Symptoms  5/27/2022 1305 by Chris Mcgill RN  Outcome: Met  5/27/2022 1002 by Chris Mcgill RN  Outcome: Ongoing, Progressing

## 2022-05-27 NOTE — PLAN OF CARE
[facility] - Tele[facility]down ([place])  Initial Discharge Assessment       Primary Care Provider: Carlitos Eagle MD    Admission Diagnosis: Epistaxis [R04.0]  Chest pain [R07.9]    Admission Date: 5/25/2022  Expected Discharge Date: 5/27/2022    Discharge Barriers Identified: None    Payor: MEDICARE / Plan: MEDICARE PART A & B / Product Type: Government /     Extended Emergency Contact Information  Primary Emergency Contact: Domingo Scales  Home Phone: [phone]  Relation: Spouse  Preferred language: English   needed? No  Secondary Emergency Contact: Kelsy Nieto  [place] Phone: [phone]  Relation: Daughter  Preferred language: English   needed? No    Discharge Plan A: Home with family  Discharge Plan B: Home      [facility] - [place], [place] - [address] [place] [address]  Phone: [phone] Fax: [phone]    [id number] - [place], [place] - [address]  [address]  [place] [address]  Phone: [phone] Fax: [phone]      Initial Assessment (most recent)     Adult Discharge Assessment - 05/27/22 1208        Discharge Assessment    Assessment Type Discharge Planning Assessment     Confirmed/corrected address, phone number and insurance Yes     Confirmed Demographics Correct on Facesheet     Source of Information family     Reason For Admission epistaxis     Lives With spouse     Facility Arrived From: home     Do you expect to return to your current living situation? Yes     Do you have help at home or someone to help you manage your care at home? Yes     Who are your caregiver(s) and their phone number(s)? martha Marcial [phone]     Prior to hospitilization cognitive status: Alert/Oriented     Current cognitive status: Alert/Oriented     Walking or Climbing Stairs Difficulty none     Dressing/Bathing Difficulty none     Equipment Currently Used at Home none     Patient currently  being followed by outpatient case management? No     Do you currently have service(s) that help you manage your care at home? No     Who is going to help you get home at discharge? daughter Aggie     How do you get to doctors appointments? car, drives self;family or friend will provide     Are you on dialysis? No     Do you take coumadin? No     Discharge Plan A Home with family     Discharge Plan B Home     DME Needed Upon Discharge  other (see comments)   TBD    Discharge Barriers Identified None               CM spoke with patient's daughter in 7069 for DISCHARGE PLANNING ASSESSMENT. Per daughter, pt lives with his wife in a single family home with 3/4 steps to porch and point of entry.  Patient was independent with ADLS and DID NOT use DME or in-home assistive equipment.  Pt is not on dialysis or Coumadin, takes medications as prescribed / keeps refilled / has resources for all daily and prescriptive needs.  Preferred pharmacy is [facility]-Agreeable to bedside delivery.  Will have help from daughter and other immediate family upon discharge.  All questions addressed.  Will continue to follow for course of hospitalization.    *ENT clinic will call pt's daughter with date and time of appt.  Daughter will provide transportation home at time of d/c.  Pt will be staying with daughter post hospitalization for recovery.  Pt's spouse is also currently hospitalized.      Yasmin Reyes RN CM  [id number]  Case Management

## 2022-05-27 NOTE — PLAN OF CARE
Patient received, AAOx4. VSS. Denies pain, daughter at bedside. Patient with bruising and swelling to face. Neuro checks q4, WNL at this time. Continue tele monitoring. Fall precautions maintained. Call bell within reach, no needs at this time.    Problem: Adult Inpatient Plan of Care  Goal: Plan of Care Review  Outcome: Ongoing, Progressing  Goal: Patient-Specific Goal (Individualized)  Outcome: Ongoing, Progressing  Goal: Absence of Hospital-Acquired Illness or Injury  Outcome: Ongoing, Progressing  Goal: Optimal Comfort and Wellbeing  Outcome: Ongoing, Progressing  Goal: Readiness for Transition of Care  Outcome: Ongoing, Progressing     Problem: Infection  Goal: Absence of Infection Signs and Symptoms  Outcome: Ongoing, Progressing

## 2022-05-29 NOTE — ASSESSMENT & PLAN NOTE
- presents with intractable bilateral epistaxis s/p fall w/ head trauma  - rhino rocket placed in ED with hemostasis  - ENT consulted; appreciate recs   - recommend Afrin q4 hours and Augmentin BID   - placed dissolvable  the right nare, keep rapid rhino in place on left side - to be removed in 3-4 days  - hold ASA, brillinta - ok to resume in 2 days  - follow up with PCP or ENT within one week to remove packing, continue Augmentin until packing removed

## 2022-05-29 NOTE — DISCHARGE SUMMARY
[facility] [address]  [facility] Medicine  Discharge Summary      Patient Name: Giacomo Scales  MRN: [id number]  Patient Class: OP- Observation  Admission Date: 5/25/2022  Hospital Length of Stay: 0 days  Discharge Date and Time: 5/27/2022  2:34 PM  Attending Physician: No att. providers found   Discharging Provider: Isatu Mills PA-C  Primary Care Provider: Carlitos Eagle MD  [facility] Medicine Team: [facility] HOSP MED F Isatu Mills PA-C    HPI:   Giacomo Scales is a 82 y.o. male with a PMHx of HTN, CAD s/p CABG, anxiety who presents to [facility] with intractable epistaxis s/p fall with head trauma that occurred earlier today. Patient tripped over his feet causing him to fall onto his face earlier today. He was seen in the ED at had a lac repaired. He had a mild nosebleed at that time which was controlled with afrin so he was discharged home with afrin. Shortly after returning home, patient began having a heavy nosebleed from left nares which did not respond with afrin so he returned to the ED. Bleeding was initially only from the L nare for which a rhino rocket was placed with hemostasis.  Planned for discharge from the ED, but patient had recurrent bleeding around the rhino rocket right before leaving the ED. He also eventually began to have bleeding from the R nare. Gauze packing with LETS gel in the R nare. ED provider discussed with ENT who do not recommend any further intervention right now and plan to see patient in AM.     Shortly before my exam, patient had an episode of hematemesis while in the ED. Discussed with ENT who agree that hematemesis is 2/2 swallowing blood from bleeding nose throughout the day. Nothing to do from ENT standpoint tonight, they plan to formally evaluate patient in AM. Advised patient to stay in bed and rest as bleeding reoccurs when standing/ exerting himself. Currently HDS, Hgb stable at 12.3.      * No surgery found *      Hospital Course:   Patient  placed in observation for epistaxis management. ENT consulted and recommend afrin q4 hours and augmentin BID for prophylaxis while rapid rhino is in place. ENT also placed dissolvable fibrillar in the right nare and kept the rapid rhino in place in the left nare, will need to remove within 3-4 days. Hb decreased from 12.3 to 11.1, but stable. Patient reports no further episodes of bleeding, stable for discharge. Follow-up with ENT and PCP. Return precautions given and patient verbalized understanding.        Goals of Care Treatment Preferences:  Code Status: Full Code      Consults:   Consults (From admission, onward)        Status Ordering Provider     Inpatient consult to ENT  Once        Provider:  (Not yet assigned)    Completed JULES MERRILL          * Epistaxis  - presents with intractable bilateral epistaxis s/p fall w/ head trauma  - rhino rocket placed in ED with hemostasis  - ENT consulted; appreciate recs   - recommend Afrin q4 hours and Augmentin BID   - placed dissolvable  the right nare, keep rapid rhino in place on left side - to be removed in 3-4 days  - hold ASA, brillinta - ok to resume in 2 days  - follow up with PCP or ENT within one week to remove packing, continue Augmentin until packing removed    Hypertension  - keep BP <130/90 to avoid increased risk of recurrent epistaxis   - continue imdur 30 BID and MTP 25mg daily     Anxiety  - continue Wellbutrin     HLD (hyperlipidemia)  - continue statin     Coronary artery disease involving native heart  S/p CABG  - hold ASA and brillianta given epistaxis >> ok to resume  - continue statin       Final Active Diagnoses:    Diagnosis Date Noted POA    PRINCIPAL PROBLEM:  Epistaxis [R04.0] 05/25/2022 Yes    Hypertension [I10] 05/26/2022 Yes    Coronary artery disease involving native heart [I25.10] 08/17/2020 Yes    S/P CABG (coronary artery bypass graft) [Z95.1] 08/17/2020 Not Applicable    HLD (hyperlipidemia) [E78.5] 08/17/2020 Yes     Anxiety [F41.9] 08/17/2020 Yes      Problems Resolved During this Admission:       Discharged Condition: good    Disposition: Home or Self Care    Follow Up:   Follow-up Information     [facility] [place] [address] In 3 days.    Specialty: Otolaryngology  Why: Clinic nurse will call with your appointment date and time.  Contact information:  [address] [facility]  [facility] [address] [phone]  Additional information:  [facility] - Main Building, 4th Floor   Please park in [facility] and use Clinic elevator           Carlitos Eagle MD Follow up on 5/30/2022.    Specialty: Family Medicine  Why: Hospital follow up visit at 1030am. Please bring your discharge summary, insurance card, current medications, and ID  Contact information:  [address] [place] [id number]  [phone]                       Patient Instructions:      Diet Cardiac     Notify your health care provider if you experience any of the following:  persistent dizziness, light-headedness, or visual disturbances     Notify your health care provider if you experience any of the following:  temperature >100.4     Notify your health care provider if you experience any of the following:  redness, tenderness, or signs of infection (pain, swelling, redness, odor or green/yellow discharge around incision site)     Activity as tolerated       Pending Diagnostic Studies:     None         Medications:  Reconciled Home Medications:      Medication List      START taking these medications    amoxicillin-clavulanate 875-125mg 875-125 mg per tablet  Commonly known as: AUGMENTIN  Take 1 tablet by mouth 2 (two) times daily. for 5 days     NASAL SPRAY (OXYMETAZOLINE) 0.05 % nasal spray  Generic drug: oxymetazoline  2 sprays by Nasal route every 4 (four) hours. for 3 days        CONTINUE taking these medications    alfuzosin 10 mg Tb24  Commonly known as: UROXATRAL  Take 10 mg by mouth daily with breakfast.     amiodarone  200 MG Tab  Commonly known as: PACERONE  Take 200 mg by mouth once daily.     aspirin 81 MG EC tablet  Commonly known as: ECOTRIN  Take 81 mg by mouth once daily.     BRILINTA 90 mg tablet  Generic drug: ticagrelor  Take 90 mg by mouth 2 (two) times daily.     buPROPion 300 MG 24 hr tablet  Commonly known as: WELLBUTRIN XL  Take 300 mg by mouth once daily.     CENTRUM SILVER ORAL  Take 1 tablet by mouth once daily.     fish oil-omega-3 fatty acids 300-1,000 mg capsule  Take 1 capsule by mouth once daily.     glucosamine-chondroitin 500-400 mg tablet  Take 1 tablet by mouth once daily.     isosorbide mononitrate 30 MG 24 hr tablet  Commonly known as: IMDUR  Take 1 tablet (30 mg total) by mouth 2 (two) times a day.     metoprolol succinate 25 MG 24 hr tablet  Commonly known as: TOPROL-XL  Take 1 tablet (25 mg total) by mouth 2 (two) times daily.     nitroGLYCERIN 0.4 MG SL tablet  Commonly known as: NITROSTAT  Place 1 tablet (0.4 mg total) under the tongue every 5 (five) minutes as needed for Chest pain.     ranolazine 500 MG Tb12  Commonly known as: RANEXA  Take 500 mg by mouth 2 (two) times daily.     rosuvastatin 40 MG Tab  Commonly known as: CRESTOR  Take 40 mg by mouth every evening.     travoprost 0.004 % ophthalmic solution  Commonly known as: TRAVATAN Z  Place 1 drop into both eyes every evening.     VITAMIN C 500 MG tablet  Generic drug: ascorbic acid (vitamin C)  Take 1,000 mg by mouth once daily.     VITAMIN D3 125 mcg (5,000 unit) Tab  Generic drug: cholecalciferol (vitamin D3)  Take 5,000 Units by mouth once daily.            Indwelling Lines/Drains at time of discharge:   Lines/Drains/Airways     None                 Time spent on the discharge of patient: 35 minutes         Isatu Mills PA-C  Department of Hospital Medicine  [facility] - Telemetry Stepdown ([place])